# Patient Record
Sex: FEMALE | Race: BLACK OR AFRICAN AMERICAN | Employment: FULL TIME | URBAN - METROPOLITAN AREA
[De-identification: names, ages, dates, MRNs, and addresses within clinical notes are randomized per-mention and may not be internally consistent; named-entity substitution may affect disease eponyms.]

---

## 2017-12-12 ENCOUNTER — OP HISTORICAL/CONVERTED ENCOUNTER (OUTPATIENT)
Dept: OTHER | Age: 62
End: 2017-12-12

## 2018-06-17 ENCOUNTER — ED HISTORICAL/CONVERTED ENCOUNTER (OUTPATIENT)
Dept: OTHER | Age: 63
End: 2018-06-17

## 2018-09-10 ENCOUNTER — HOSPITAL ENCOUNTER (OUTPATIENT)
Dept: PREADMISSION TESTING | Age: 63
Discharge: HOME | End: 2018-09-10
Payer: COMMERCIAL

## 2018-09-10 VITALS
WEIGHT: 193.34 LBS | SYSTOLIC BLOOD PRESSURE: 120 MMHG | OXYGEN SATURATION: 97 % | HEART RATE: 71 BPM | DIASTOLIC BLOOD PRESSURE: 58 MMHG | TEMPERATURE: 98.4 F | BODY MASS INDEX: 35.58 KG/M2 | HEIGHT: 62 IN | RESPIRATION RATE: 16 BRPM

## 2018-09-10 LAB
ABO + RH BLD: NORMAL
ALBUMIN SERPL-MCNC: 3.8 G/DL (ref 3.5–5)
ALBUMIN/GLOB SERPL: 1.2 {RATIO} (ref 1.1–2.2)
ALP SERPL-CCNC: 90 U/L (ref 45–117)
ALT SERPL-CCNC: 19 U/L (ref 12–78)
ANION GAP SERPL CALC-SCNC: 10 MMOL/L (ref 5–15)
APPEARANCE UR: ABNORMAL
APTT PPP: 25.9 SEC (ref 22.1–32)
AST SERPL-CCNC: 15 U/L (ref 15–37)
ATRIAL RATE: 68 BPM
BACTERIA URNS QL MICRO: NEGATIVE /HPF
BASOPHILS # BLD: 0 K/UL (ref 0–0.1)
BASOPHILS NFR BLD: 0 % (ref 0–1)
BILIRUB SERPL-MCNC: 0.5 MG/DL (ref 0.2–1)
BILIRUB UR QL: NEGATIVE
BLOOD GROUP ANTIBODIES SERPL: NORMAL
BUN SERPL-MCNC: 11 MG/DL (ref 6–20)
BUN/CREAT SERPL: 13 (ref 12–20)
CALCIUM SERPL-MCNC: 9.6 MG/DL (ref 8.5–10.1)
CALCULATED P AXIS, ECG09: 35 DEGREES
CALCULATED R AXIS, ECG10: 59 DEGREES
CALCULATED T AXIS, ECG11: 25 DEGREES
CHLORIDE SERPL-SCNC: 107 MMOL/L (ref 97–108)
CO2 SERPL-SCNC: 27 MMOL/L (ref 21–32)
COLOR UR: ABNORMAL
CREAT SERPL-MCNC: 0.88 MG/DL (ref 0.55–1.02)
DIAGNOSIS, 93000: NORMAL
DIFFERENTIAL METHOD BLD: ABNORMAL
EOSINOPHIL # BLD: 0.2 K/UL (ref 0–0.4)
EOSINOPHIL NFR BLD: 2 % (ref 0–7)
EPITH CASTS URNS QL MICRO: ABNORMAL /LPF
ERYTHROCYTE [DISTWIDTH] IN BLOOD BY AUTOMATED COUNT: 14.6 % (ref 11.5–14.5)
EST. AVERAGE GLUCOSE BLD GHB EST-MCNC: 128 MG/DL
GLOBULIN SER CALC-MCNC: 3.3 G/DL (ref 2–4)
GLUCOSE SERPL-MCNC: 98 MG/DL (ref 65–100)
GLUCOSE UR STRIP.AUTO-MCNC: NEGATIVE MG/DL
HBA1C MFR BLD: 6.1 % (ref 4.2–6.3)
HCT VFR BLD AUTO: 44.9 % (ref 35–47)
HGB BLD-MCNC: 13.8 G/DL (ref 11.5–16)
HGB UR QL STRIP: NEGATIVE
HYALINE CASTS URNS QL MICRO: ABNORMAL /LPF (ref 0–5)
IMM GRANULOCYTES # BLD: 0 K/UL (ref 0–0.04)
IMM GRANULOCYTES NFR BLD AUTO: 0 % (ref 0–0.5)
INR PPP: 1 (ref 0.9–1.1)
KETONES UR QL STRIP.AUTO: NEGATIVE MG/DL
LEUKOCYTE ESTERASE UR QL STRIP.AUTO: NEGATIVE
LYMPHOCYTES # BLD: 2.9 K/UL (ref 0.8–3.5)
LYMPHOCYTES NFR BLD: 46 % (ref 12–49)
MCH RBC QN AUTO: 25.7 PG (ref 26–34)
MCHC RBC AUTO-ENTMCNC: 30.7 G/DL (ref 30–36.5)
MCV RBC AUTO: 83.6 FL (ref 80–99)
MONOCYTES # BLD: 0.4 K/UL (ref 0–1)
MONOCYTES NFR BLD: 7 % (ref 5–13)
NEUTS SEG # BLD: 2.9 K/UL (ref 1.8–8)
NEUTS SEG NFR BLD: 45 % (ref 32–75)
NITRITE UR QL STRIP.AUTO: NEGATIVE
NRBC # BLD: 0 K/UL (ref 0–0.01)
NRBC BLD-RTO: 0 PER 100 WBC
P-R INTERVAL, ECG05: 150 MS
PH UR STRIP: 5 [PH] (ref 5–8)
PLATELET # BLD AUTO: 295 K/UL (ref 150–400)
PMV BLD AUTO: 9.6 FL (ref 8.9–12.9)
POTASSIUM SERPL-SCNC: 4.4 MMOL/L (ref 3.5–5.1)
PROT SERPL-MCNC: 7.1 G/DL (ref 6.4–8.2)
PROT UR STRIP-MCNC: NEGATIVE MG/DL
PROTHROMBIN TIME: 10 SEC (ref 9–11.1)
Q-T INTERVAL, ECG07: 404 MS
QRS DURATION, ECG06: 86 MS
QTC CALCULATION (BEZET), ECG08: 429 MS
RBC # BLD AUTO: 5.37 M/UL (ref 3.8–5.2)
RBC #/AREA URNS HPF: ABNORMAL /HPF (ref 0–5)
SODIUM SERPL-SCNC: 144 MMOL/L (ref 136–145)
SP GR UR REFRACTOMETRY: 1.01 (ref 1–1.03)
SPECIMEN EXP DATE BLD: NORMAL
THERAPEUTIC RANGE,PTTT: NORMAL SECS (ref 58–77)
UA: UC IF INDICATED,UAUC: ABNORMAL
UROBILINOGEN UR QL STRIP.AUTO: 0.2 EU/DL (ref 0.2–1)
VENTRICULAR RATE, ECG03: 68 BPM
WBC # BLD AUTO: 6.4 K/UL (ref 3.6–11)
WBC URNS QL MICRO: ABNORMAL /HPF (ref 0–4)

## 2018-09-10 PROCEDURE — 85610 PROTHROMBIN TIME: CPT | Performed by: ORTHOPAEDIC SURGERY

## 2018-09-10 PROCEDURE — 85730 THROMBOPLASTIN TIME PARTIAL: CPT | Performed by: ORTHOPAEDIC SURGERY

## 2018-09-10 PROCEDURE — 81001 URINALYSIS AUTO W/SCOPE: CPT | Performed by: ORTHOPAEDIC SURGERY

## 2018-09-10 PROCEDURE — 36415 COLL VENOUS BLD VENIPUNCTURE: CPT | Performed by: ORTHOPAEDIC SURGERY

## 2018-09-10 PROCEDURE — 85025 COMPLETE CBC W/AUTO DIFF WBC: CPT | Performed by: ORTHOPAEDIC SURGERY

## 2018-09-10 PROCEDURE — 80053 COMPREHEN METABOLIC PANEL: CPT | Performed by: ORTHOPAEDIC SURGERY

## 2018-09-10 PROCEDURE — 83036 HEMOGLOBIN GLYCOSYLATED A1C: CPT | Performed by: ORTHOPAEDIC SURGERY

## 2018-09-10 PROCEDURE — 86900 BLOOD TYPING SEROLOGIC ABO: CPT | Performed by: ORTHOPAEDIC SURGERY

## 2018-09-10 PROCEDURE — 93005 ELECTROCARDIOGRAM TRACING: CPT

## 2018-09-10 RX ORDER — TRAMADOL HYDROCHLORIDE 50 MG/1
50 TABLET ORAL
COMMUNITY
End: 2018-09-24

## 2018-09-10 RX ORDER — PRAVASTATIN SODIUM 40 MG/1
40 TABLET ORAL
COMMUNITY

## 2018-09-10 RX ORDER — BENAZEPRIL HYDROCHLORIDE 10 MG/1
10 TABLET ORAL DAILY
COMMUNITY

## 2018-09-10 RX ORDER — PANTOPRAZOLE SODIUM 40 MG/1
40 TABLET, DELAYED RELEASE ORAL DAILY
COMMUNITY

## 2018-09-10 RX ORDER — GABAPENTIN 600 MG/1
900 TABLET ORAL
COMMUNITY
End: 2018-09-24

## 2018-09-10 RX ORDER — CYCLOBENZAPRINE HCL 5 MG
5 TABLET ORAL
COMMUNITY
End: 2018-09-24

## 2018-09-10 NOTE — PERIOP NOTES
[facility], [address]    MAIN OR [phone]    MAIN PRE OP [phone]    AMBULATORY PRE OP [phone]    PRE-ADMISSION TESTING [phone]       Surgery Date:   9/18/2018        Is surgery arrival time given by surgeon? NO  If NO, [facility] staff will call you between 3 and 7pm the day before your surgery with your arrival time. (If your surgery is on a Monday, we will call you the Friday before.)    Call [phone] after 7pm Monday-Friday if you did not receive your arrival time.     Answers to Common Questions   When You  Arrive   Arrive at the [facility] [address] [facility] on the day of your surgery  Have your insurance card, photo ID, and any copayment (if needed)     Food   and   Drink   NO food or drink after midnight the night before surgery    This means NO water, gum, mints, coffee, juice, etc.  No alcohol (beer, wine, liquor) 24 hours before and after surgery     Medicine to   TAKE   Morning of Surgery   MEDICATIONS TO TAKE THE MORNING OF SURGERY WITH A SIP OF WATER:    HOLD the benazepril but take the pantoprazole and may take tramadol or cyclobenzaprine as needed     Medicine  To  STOP   FOR PAIN   You can take Tylenol - follow instructions on the bottle   NO Aspirin for pain    NO Non-Steroidal Anti-Inflammatory Drugs (NSAIDs:   for example, Ibuprofen (Advil, Motrin), Naproxen (Aleve)   STOP herbal supplements and vitamins 1 week before surgery     Blood  Thinners    If you take Aspirin, Plavix, Coumadin, blood-thinning or anti-clot medicine, talk to your surgeon and/or follow the instructions from the doctor who told you to take that medicine     Clothing  Jewelry  Valuables  Bathing     CLOTHING   Wear loose, comfortable clothes   Wear glasses instead of contacts   Leave money, jewelry and valuables at home   No make-up, particularly mascara, the day of surgery   REMOVE ALL piercings, rings, and jewelry - leave at home   Wear hair loose or down; no pony-tails, buns, or metal hair clips    BATHING   Follow all special bath instructions (for total joint replacement, spine and bowel surgeries.)   If you shower the morning of surgery, please do not apply any lotions, powders, or deodorants afterwards. Do not shave or trim anywhere 24 hours before surgery. Going Home  or  Spending the Night    SAME-DAY SURGERY: You must have a responsible adult drive you home and stay with you 24 hours after surgery   ADMITS: If your doctor is keeping you into the hospital after surgery, leave personal belongings/luggage in your car until you have a hospital room number. Hospital discharge time is 12 noon  Drivers must be here before 12 noon unless you are told differently         Follow all instructions so your surgery wont be cancelled. Please, be on time. If a situation occurs and you are delayed the day of surgery, call [phone] or [phone]. If your physical condition changes (like a fever, cold, flu, etc.) call your surgeon as soon as possible. The Preadmission Testing staff can be reached at [phone]. OTHER SPECIAL INSTRUCTIONS:  Use care fusion wash as directed for the 3 days before the day of surgery. Practice with incentive spirometer twice a day for the week before surgery and pack it in your overnight bag, which you need to leave in the car until there is a room assigned. Free  parking 7-5    The patient and spouse was contacted  in person. She  verbalize  understanding of all instructions and does not  need reinforcement.

## 2018-09-10 NOTE — H&P
PAT Pre-Op History & Physical    Patient: Brandon Chowdhury                  MRN: [id number]          SSN: [id number]  YOB: 1955          Age: 61 y.o. Sex: female                Subjective:   Patient is a 61 y.o.  female who presents with history of neck pain that began about 4 months ago per patient report. Denies and trauma or injury that preceded the onset of her neck pain. States that the pain radiates from her posterior left neck into her left shoulder and down her left arm into her left hand. Describes the pain as constant and sharp. Also c/o tingling in her left arm and left hand. Rates her pain 9/10. States that turning her head and looking up exacerbate her pain and it keeps her from falling asleep/staying asleep at night. Also cannot do household chores such as washing dishes due to her neck pain. Patient is left hand dominant and states she has noticed weakness in that hand. Has failed oral steroids, NSAIDs, muscle relaxants, Gabapentin, narcotic pain medication, and application of heat. The patient was evaluated in the surgeon's office and it was determined that the most appropriate plan of care is to proceed with surgical intervention. Patient's PCP Nam Kearney MD [phone].       Past Medical History:   Diagnosis Date    Arthritis     GERD (gastroesophageal reflux disease)     H/O degenerative disc disease     Hyperlipemia     Hypertension     Nausea & vomiting     Obesity (BMI 30-39.9) 09/10/2018    BMI= 36    Sleep apnea     CPAP- has not used since weight loss    Vertigo       Past Surgical History:   Procedure Laterality Date    ABDOMEN SURGERY PROC UNLISTED  1989    Abdominal wall surgery after childbirth    HX COLONOSCOPY      HX HEART CATHETERIZATION  2014    Patient states no blockages found    HX HYSTERECTOMY  1999    HX ORTHOPAEDIC Left 2014    hip replacement      Prior to Admission medications    Medication Sig Start Date End Date Taking? Authorizing Provider   gabapentin (NEURONTIN) 600 mg tablet Take 900 mg by mouth nightly. Yes Historical Provider   traMADol (ULTRAM) 50 mg tablet Take 50 mg by mouth every six (6) hours as needed for Pain. Yes Historical Provider   pravastatin (PRAVACHOL) 40 mg tablet Take 40 mg by mouth nightly. Yes Historical Provider   pantoprazole (PROTONIX) 40 mg tablet Take 40 mg by mouth daily. Yes Historical Provider   benazepril (LOTENSIN) 10 mg tablet Take 10 mg by mouth daily. Yes Historical Provider   cyclobenzaprine (FLEXERIL) 5 mg tablet Take 5 mg by mouth three (3) times daily as needed for Muscle Spasm(s). Yes Historical Provider     Current Outpatient Prescriptions   Medication Sig    gabapentin (NEURONTIN) 600 mg tablet Take 900 mg by mouth nightly.  traMADol (ULTRAM) 50 mg tablet Take 50 mg by mouth every six (6) hours as needed for Pain.  pravastatin (PRAVACHOL) 40 mg tablet Take 40 mg by mouth nightly.  pantoprazole (PROTONIX) 40 mg tablet Take 40 mg by mouth daily.  benazepril (LOTENSIN) 10 mg tablet Take 10 mg by mouth daily.  cyclobenzaprine (FLEXERIL) 5 mg tablet Take 5 mg by mouth three (3) times daily as needed for Muscle Spasm(s). No current facility-administered medications for this encounter.        Allergies   Allergen Reactions    Codeine Other (comments)     Feels confused, \"makes me feel like I'm going out my head\"    Pcn [Penicillins] Itching    Tomato Itching      Social History   Substance Use Topics    Smoking status: Never Smoker    Smokeless tobacco: Never Used    Alcohol use No      History   Drug Use No     Family History   Problem Relation Age of Onset    Heart Failure Mother      pacemaker    Hypertension Mother     Diabetes Mother     High Cholesterol Mother     Cancer Father      Bladder    High Cholesterol Father     Hypertension Father     No Known Problems Sister     Colon Cancer Brother     Alcohol abuse Brother Cirrrohis    Arrhythmia Sister      Tachycardia    Diabetes Sister     Arthritis-osteo Sister          Review of Systems    Patient denies difficulty swallowing, mouth sores, or loose teeth. Patient denies any recent dental procedures or any planned prior to surgery. Patient denies chest pain, tightness, palpitations. Denies dizziness, visual disturbances, or lightheadedness. Patient denies shortness of breath, wheezing, cough, fever, or chills. Patient denies diarrhea, constipation, or abdominal pain. Patient denies urinary problems including dysuria, hesitancy, urgency, or incontinence. Denies skin breakdown, rashes, insect bites or open area. C/o neck/ left arm pain. Objective:   Patient Vitals for the past 24 hrs:   Temp Pulse Resp BP SpO2   09/10/18 0907 98.4 °F (36.9 °C) 71 16 120/58 97 %     Temp (24hrs), Av.4 °F (36.9 °C), Min:98.4 °F (36.9 °C), Max:98.4 °F (36.9 °C)    Body mass index is 35.94 kg/(m^2). Wt Readings from Last 1 Encounters:   09/10/18 87.7 kg (193 lb 5.5 oz)        Physical Exam:     General: Pleasant,  cooperative, no apparent distress, appears stated age. Uses cane to ambulate. Eyes: Conjunctivae/corneas clear. EOMs intact. Nose: Nares normal.   Mouth/Throat: Lips, mucosa, and tongue normal. Teeth and gums normal.   Neck: Symmetrical, trachea midline. Back: Symmetric   Lungs: Clear to auscultation bilaterally. Heart: Regular rate and rhythm, S1, S2 normal. No murmur, click, rub or gallop. Abdomen: Soft, non-tender. Bowel sounds normal. No distention. Musculoskeletal:  Cervical ROM limited by discomfort. Extremities:  Extremities normal, atraumatic, no cyanosis or edema. Calves                                 supple, non tender to palpation. Pulses: 2+ and symmetric bilateral upper extremities. Cap. refill <2 seconds   Skin: Skin color, texture, turgor normal.  No visible rashes or lesions. Neurologic: CN II-XII grossly intact.   Alert and oriented x3.    Labs:   Recent Results (from the past 72 hour(s))   CULTURE, MRSA    Collection Time: 09/10/18  9:38 AM   Result Value Ref Range    Special Requests: NO SPECIAL REQUESTS      Culture result: MRSA NOT PRESENT      Culture result:            Screening of patient nares for MRSA is for surveillance purposes and, if positive, to facilitate isolation considerations in high risk settings. It is not intended for automatic decolonization interventions per se as regimens are not sufficiently effective to warrant routine use. CBC WITH AUTOMATED DIFF    Collection Time: 09/10/18 10:08 AM   Result Value Ref Range    WBC 6.4 3.6 - 11.0 K/uL    RBC 5.37 (H) 3.80 - 5.20 M/uL    HGB 13.8 11.5 - 16.0 g/dL    HCT 44.9 35.0 - 47.0 %    MCV 83.6 80.0 - 99.0 FL    MCH 25.7 (L) 26.0 - 34.0 PG    MCHC 30.7 30.0 - 36.5 g/dL    RDW 14.6 (H) 11.5 - 14.5 %    PLATELET 949 742 - 592 K/uL    MPV 9.6 8.9 - 12.9 FL    NRBC 0.0 0  WBC    ABSOLUTE NRBC 0.00 0.00 - 0.01 K/uL    NEUTROPHILS 45 32 - 75 %    LYMPHOCYTES 46 12 - 49 %    MONOCYTES 7 5 - 13 %    EOSINOPHILS 2 0 - 7 %    BASOPHILS 0 0 - 1 %    IMMATURE GRANULOCYTES 0 0.0 - 0.5 %    ABS. NEUTROPHILS 2.9 1.8 - 8.0 K/UL    ABS. LYMPHOCYTES 2.9 0.8 - 3.5 K/UL    ABS. MONOCYTES 0.4 0.0 - 1.0 K/UL    ABS. EOSINOPHILS 0.2 0.0 - 0.4 K/UL    ABS. BASOPHILS 0.0 0.0 - 0.1 K/UL    ABS. IMM.  GRANS. 0.0 0.00 - 0.04 K/UL    DF AUTOMATED     METABOLIC PANEL, COMPREHENSIVE    Collection Time: 09/10/18 10:08 AM   Result Value Ref Range    Sodium 144 136 - 145 mmol/L    Potassium 4.4 3.5 - 5.1 mmol/L    Chloride 107 97 - 108 mmol/L    CO2 27 21 - 32 mmol/L    Anion gap 10 5 - 15 mmol/L    Glucose 98 65 - 100 mg/dL    BUN 11 6 - 20 MG/DL    Creatinine 0.88 0.55 - 1.02 MG/DL    BUN/Creatinine ratio 13 12 - 20      GFR est AA >60 >60 ml/min/1.73m2    GFR est non-AA >60 >60 ml/min/1.73m2    Calcium 9.6 8.5 - 10.1 MG/DL    Bilirubin, total 0.5 0.2 - 1.0 MG/DL    ALT (SGPT) 19 12 - 78 U/L    AST (SGOT) 15 15 - 37 U/L    Alk.  phosphatase 90 45 - 117 U/L    Protein, total 7.1 6.4 - 8.2 g/dL    Albumin 3.8 3.5 - 5.0 g/dL    Globulin 3.3 2.0 - 4.0 g/dL    A-G Ratio 1.2 1.1 - 2.2     HEMOGLOBIN A1C WITH EAG    Collection Time: 09/10/18 10:08 AM   Result Value Ref Range    Hemoglobin A1c 6.1 4.2 - 6.3 %    Est. average glucose 128 mg/dL   URINALYSIS W/ REFLEX CULTURE    Collection Time: 09/10/18 10:08 AM   Result Value Ref Range    Color YELLOW/STRAW      Appearance CLOUDY (A) CLEAR      Specific gravity 1.015 1.003 - 1.030      pH (UA) 5.0 5.0 - 8.0      Protein NEGATIVE  NEG mg/dL    Glucose NEGATIVE  NEG mg/dL    Ketone NEGATIVE  NEG mg/dL    Bilirubin NEGATIVE  NEG      Blood NEGATIVE  NEG      Urobilinogen 0.2 0.2 - 1.0 EU/dL    Nitrites NEGATIVE  NEG      Leukocyte Esterase NEGATIVE  NEG      WBC 0-4 0 - 4 /hpf    RBC 0-5 0 - 5 /hpf    Epithelial cells FEW FEW /lpf    Bacteria NEGATIVE  NEG /hpf    UA:UC IF INDICATED CULTURE NOT INDICATED BY UA RESULT CNI      Hyaline cast 0-2 0 - 5 /lpf   TYPE & SCREEN    Collection Time: 09/10/18 10:08 AM   Result Value Ref Range    Crossmatch Expiration 09/21/2018     ABO/Rh(D) B POSITIVE     Antibody screen NEG    PTT    Collection Time: 09/10/18 10:08 AM   Result Value Ref Range    aPTT 25.9 22.1 - 32.0 sec    aPTT, therapeutic range     58.0 - 77.0 SECS   PROTHROMBIN TIME + INR    Collection Time: 09/10/18 10:08 AM   Result Value Ref Range    INR 1.0 0.9 - 1.1      Prothrombin time 10.0 9.0 - 11.1 sec   EKG, 12 LEAD, INITIAL    Collection Time: 09/10/18 10:32 AM   Result Value Ref Range    Ventricular Rate 68 BPM    Atrial Rate 68 BPM    P-R Interval 150 ms    QRS Duration 86 ms    Q-T Interval 404 ms    QTC Calculation (Bezet) 429 ms    Calculated P Axis 35 degrees    Calculated R Axis 59 degrees    Calculated T Axis 25 degrees    Diagnosis       Normal sinus rhythm  Normal ECG  No previous ECGs available  Confirmed by Young Gutierrez MD. ([id number]) on 9/10/2018 10:55:52 PM         Assessment:     Acute cervical radiculopathy [M54.12]  Cervical spinal stenosis [M48.02]    Plan:     Scheduled for C4 - C7  ACDF with instrumentation. Labs and EKG done per surgeon's orders. Lab results and EKG reviewed- unremarkable. MRSA negative.         Inell Balshar, NP

## 2018-09-11 LAB
BACTERIA SPEC CULT: NORMAL
BACTERIA SPEC CULT: NORMAL
SERVICE CMNT-IMP: NORMAL

## 2018-09-17 ENCOUNTER — ANESTHESIA EVENT (OUTPATIENT)
Dept: SURGERY | Age: 63
DRG: 473 | End: 2018-09-17
Payer: COMMERCIAL

## 2018-09-18 ENCOUNTER — APPOINTMENT (OUTPATIENT)
Dept: GENERAL RADIOLOGY | Age: 63
DRG: 473 | End: 2018-09-18
Attending: ORTHOPAEDIC SURGERY
Payer: COMMERCIAL

## 2018-09-18 ENCOUNTER — ANESTHESIA (OUTPATIENT)
Dept: SURGERY | Age: 63
DRG: 473 | End: 2018-09-18
Payer: COMMERCIAL

## 2018-09-18 ENCOUNTER — HOSPITAL ENCOUNTER (INPATIENT)
Age: 63
LOS: 3 days | Discharge: NURSING, REHAB OR LONG-TERM CARE | DRG: 473 | End: 2018-09-24
Attending: ORTHOPAEDIC SURGERY | Admitting: ORTHOPAEDIC SURGERY
Payer: COMMERCIAL

## 2018-09-18 DIAGNOSIS — G89.18 ACUTE POSTOPERATIVE PAIN: ICD-10-CM

## 2018-09-18 DIAGNOSIS — M48.02 CERVICAL STENOSIS OF SPINAL CANAL: Primary | ICD-10-CM

## 2018-09-18 PROCEDURE — 77030003666 HC NDL SPINAL BD -A: Performed by: ORTHOPAEDIC SURGERY

## 2018-09-18 PROCEDURE — 74011000250 HC RX REV CODE- 250

## 2018-09-18 PROCEDURE — 77030008684 HC TU ET CUF COVD -B: Performed by: ANESTHESIOLOGY

## 2018-09-18 PROCEDURE — 0RB30ZZ EXCISION OF CERVICAL VERTEBRAL DISC, OPEN APPROACH: ICD-10-PCS | Performed by: ORTHOPAEDIC SURGERY

## 2018-09-18 PROCEDURE — 76060000036 HC ANESTHESIA 2.5 TO 3 HR: Performed by: ORTHOPAEDIC SURGERY

## 2018-09-18 PROCEDURE — 0RG20A0 FUSION OF 2 OR MORE CERVICAL VERTEBRAL JOINTS WITH INTERBODY FUSION DEVICE, ANTERIOR APPROACH, ANTERIOR COLUMN, OPEN APPROACH: ICD-10-PCS | Performed by: ORTHOPAEDIC SURGERY

## 2018-09-18 PROCEDURE — 77030030102 HC BIT DRL PYRNES K2M -B: Performed by: ORTHOPAEDIC SURGERY

## 2018-09-18 PROCEDURE — C1713 ANCHOR/SCREW BN/BN,TIS/BN: HCPCS | Performed by: ORTHOPAEDIC SURGERY

## 2018-09-18 PROCEDURE — 99218 HC RM OBSERVATION: CPT

## 2018-09-18 PROCEDURE — 76210000017 HC OR PH I REC 1.5 TO 2 HR: Performed by: ORTHOPAEDIC SURGERY

## 2018-09-18 PROCEDURE — 77030018836 HC SOL IRR NACL ICUM -A: Performed by: ORTHOPAEDIC SURGERY

## 2018-09-18 PROCEDURE — 77030020782 HC GWN BAIR PAWS FLX 3M -B

## 2018-09-18 PROCEDURE — 77030004391 HC BUR FLUT MEDT -C: Performed by: ORTHOPAEDIC SURGERY

## 2018-09-18 PROCEDURE — 77030029099 HC BN WAX SSPC -A: Performed by: ORTHOPAEDIC SURGERY

## 2018-09-18 PROCEDURE — 77030031139 HC SUT VCRL2 J&J -A: Performed by: ORTHOPAEDIC SURGERY

## 2018-09-18 PROCEDURE — 77030037302 HC SPCR CERV LORDTC INLC -G: Performed by: ORTHOPAEDIC SURGERY

## 2018-09-18 PROCEDURE — 74011250636 HC RX REV CODE- 250/636: Performed by: ANESTHESIOLOGY

## 2018-09-18 PROCEDURE — 74011250636 HC RX REV CODE- 250/636: Performed by: ORTHOPAEDIC SURGERY

## 2018-09-18 PROCEDURE — 76000 FLUOROSCOPY <1 HR PHYS/QHP: CPT

## 2018-09-18 PROCEDURE — 77030018673: Performed by: ORTHOPAEDIC SURGERY

## 2018-09-18 PROCEDURE — 77030032490 HC SLV COMPR SCD KNE COVD -B: Performed by: ORTHOPAEDIC SURGERY

## 2018-09-18 PROCEDURE — 74011250637 HC RX REV CODE- 250/637: Performed by: ORTHOPAEDIC SURGERY

## 2018-09-18 PROCEDURE — 76010000172 HC OR TIME 2.5 TO 3 HR INTENSV-TIER 1: Performed by: ORTHOPAEDIC SURGERY

## 2018-09-18 PROCEDURE — 74011250636 HC RX REV CODE- 250/636

## 2018-09-18 PROCEDURE — 74011000272 HC RX REV CODE- 272: Performed by: ORTHOPAEDIC SURGERY

## 2018-09-18 PROCEDURE — 77030011640 HC PAD GRND REM COVD -A: Performed by: ORTHOPAEDIC SURGERY

## 2018-09-18 PROCEDURE — 77030018846 HC SOL IRR STRL H20 ICUM -A: Performed by: ORTHOPAEDIC SURGERY

## 2018-09-18 PROCEDURE — 77030013079 HC BLNKT BAIR HGGR 3M -A: Performed by: ANESTHESIOLOGY

## 2018-09-18 PROCEDURE — 77030012406 HC DRN WND PENRS BARD -A: Performed by: ORTHOPAEDIC SURGERY

## 2018-09-18 PROCEDURE — 74011000250 HC RX REV CODE- 250: Performed by: ORTHOPAEDIC SURGERY

## 2018-09-18 PROCEDURE — 77030011267 HC ELECTRD BLD COVD -A: Performed by: ORTHOPAEDIC SURGERY

## 2018-09-18 PROCEDURE — 77030002933 HC SUT MCRYL J&J -A: Performed by: ORTHOPAEDIC SURGERY

## 2018-09-18 PROCEDURE — 77030026438 HC STYL ET INTUB CARD -A: Performed by: ANESTHESIOLOGY

## 2018-09-18 DEVICE — PLATE SPNL L54MM BILAT ANTR CERV TI 3 LEV CONSTRN LO PROF: Type: IMPLANTABLE DEVICE | Site: NECK | Status: FUNCTIONAL

## 2018-09-18 DEVICE — SPACER SPNL L15XW13XH9MM ANT CERV INTBDY FUS LORD CAPISTRANO: Type: IMPLANTABLE DEVICE | Site: NECK | Status: FUNCTIONAL

## 2018-09-18 DEVICE — IMPLANTABLE DEVICE: Type: IMPLANTABLE DEVICE | Site: NECK | Status: FUNCTIONAL

## 2018-09-18 DEVICE — SCREW SPNL L14MM DIA4MM CERV ST CONSTRN PYRENEES: Type: IMPLANTABLE DEVICE | Site: NECK | Status: FUNCTIONAL

## 2018-09-18 RX ORDER — ONDANSETRON 2 MG/ML
4 INJECTION INTRAMUSCULAR; INTRAVENOUS
Status: DISCONTINUED | OUTPATIENT
Start: 2018-09-18 | End: 2018-09-24 | Stop reason: HOSPADM

## 2018-09-18 RX ORDER — OXYCODONE HYDROCHLORIDE 5 MG/1
5 TABLET ORAL
Status: DISCONTINUED | OUTPATIENT
Start: 2018-09-18 | End: 2018-09-19

## 2018-09-18 RX ORDER — SODIUM CHLORIDE 0.9 % (FLUSH) 0.9 %
5-10 SYRINGE (ML) INJECTION EVERY 8 HOURS
Status: DISCONTINUED | OUTPATIENT
Start: 2018-09-18 | End: 2018-09-18 | Stop reason: HOSPADM

## 2018-09-18 RX ORDER — SODIUM CHLORIDE 0.9 % (FLUSH) 0.9 %
5-10 SYRINGE (ML) INJECTION AS NEEDED
Status: DISCONTINUED | OUTPATIENT
Start: 2018-09-18 | End: 2018-09-18 | Stop reason: HOSPADM

## 2018-09-18 RX ORDER — FACIAL-BODY WIPES
10 EACH TOPICAL DAILY PRN
Status: DISCONTINUED | OUTPATIENT
Start: 2018-09-20 | End: 2018-09-24 | Stop reason: HOSPADM

## 2018-09-18 RX ORDER — NALOXONE HYDROCHLORIDE 0.4 MG/ML
0.2 INJECTION, SOLUTION INTRAMUSCULAR; INTRAVENOUS; SUBCUTANEOUS
Status: DISCONTINUED | OUTPATIENT
Start: 2018-09-18 | End: 2018-09-18 | Stop reason: HOSPADM

## 2018-09-18 RX ORDER — SODIUM CHLORIDE 9 MG/ML
125 INJECTION, SOLUTION INTRAVENOUS CONTINUOUS
Status: DISPENSED | OUTPATIENT
Start: 2018-09-18 | End: 2018-09-19

## 2018-09-18 RX ORDER — GABAPENTIN 300 MG/1
900 CAPSULE ORAL
Status: DISCONTINUED | OUTPATIENT
Start: 2018-09-18 | End: 2018-09-24 | Stop reason: HOSPADM

## 2018-09-18 RX ORDER — CEFAZOLIN SODIUM/WATER 2 G/20 ML
2 SYRINGE (ML) INTRAVENOUS ONCE
Status: COMPLETED | OUTPATIENT
Start: 2018-09-18 | End: 2018-09-18

## 2018-09-18 RX ORDER — SODIUM CHLORIDE 0.9 % (FLUSH) 0.9 %
5-10 SYRINGE (ML) INJECTION EVERY 8 HOURS
Status: DISCONTINUED | OUTPATIENT
Start: 2018-09-19 | End: 2018-09-24 | Stop reason: HOSPADM

## 2018-09-18 RX ORDER — ROCURONIUM BROMIDE 10 MG/ML
INJECTION, SOLUTION INTRAVENOUS AS NEEDED
Status: DISCONTINUED | OUTPATIENT
Start: 2018-09-18 | End: 2018-09-18 | Stop reason: HOSPADM

## 2018-09-18 RX ORDER — AMOXICILLIN 250 MG
1 CAPSULE ORAL 2 TIMES DAILY
Status: DISCONTINUED | OUTPATIENT
Start: 2018-09-19 | End: 2018-09-24 | Stop reason: HOSPADM

## 2018-09-18 RX ORDER — PANTOPRAZOLE SODIUM 40 MG/1
40 TABLET, DELAYED RELEASE ORAL
Status: DISCONTINUED | OUTPATIENT
Start: 2018-09-19 | End: 2018-09-24 | Stop reason: HOSPADM

## 2018-09-18 RX ORDER — DEXAMETHASONE SODIUM PHOSPHATE 4 MG/ML
INJECTION, SOLUTION INTRA-ARTICULAR; INTRALESIONAL; INTRAMUSCULAR; INTRAVENOUS; SOFT TISSUE AS NEEDED
Status: DISCONTINUED | OUTPATIENT
Start: 2018-09-18 | End: 2018-09-18 | Stop reason: HOSPADM

## 2018-09-18 RX ORDER — SODIUM CHLORIDE, SODIUM LACTATE, POTASSIUM CHLORIDE, CALCIUM CHLORIDE 600; 310; 30; 20 MG/100ML; MG/100ML; MG/100ML; MG/100ML
INJECTION, SOLUTION INTRAVENOUS
Status: DISCONTINUED | OUTPATIENT
Start: 2018-09-18 | End: 2018-09-18 | Stop reason: HOSPADM

## 2018-09-18 RX ORDER — HYDROMORPHONE HYDROCHLORIDE 2 MG/ML
0.5 INJECTION, SOLUTION INTRAMUSCULAR; INTRAVENOUS; SUBCUTANEOUS
Status: ACTIVE | OUTPATIENT
Start: 2018-09-18 | End: 2018-09-19

## 2018-09-18 RX ORDER — DIPHENHYDRAMINE HYDROCHLORIDE 50 MG/ML
12.5 INJECTION, SOLUTION INTRAMUSCULAR; INTRAVENOUS
Status: DISCONTINUED | OUTPATIENT
Start: 2018-09-18 | End: 2018-09-24 | Stop reason: HOSPADM

## 2018-09-18 RX ORDER — NEOSTIGMINE METHYLSULFATE 1 MG/ML
INJECTION INTRAVENOUS AS NEEDED
Status: DISCONTINUED | OUTPATIENT
Start: 2018-09-18 | End: 2018-09-18 | Stop reason: HOSPADM

## 2018-09-18 RX ORDER — HYDROMORPHONE HYDROCHLORIDE 1 MG/ML
.25-1 INJECTION, SOLUTION INTRAMUSCULAR; INTRAVENOUS; SUBCUTANEOUS
Status: DISCONTINUED | OUTPATIENT
Start: 2018-09-18 | End: 2018-09-18 | Stop reason: HOSPADM

## 2018-09-18 RX ORDER — POLYETHYLENE GLYCOL 3350 17 G/17G
17 POWDER, FOR SOLUTION ORAL DAILY
Status: DISCONTINUED | OUTPATIENT
Start: 2018-09-19 | End: 2018-09-24 | Stop reason: HOSPADM

## 2018-09-18 RX ORDER — ACETAMINOPHEN 325 MG/1
650 TABLET ORAL
Status: DISCONTINUED | OUTPATIENT
Start: 2018-09-18 | End: 2018-09-24 | Stop reason: HOSPADM

## 2018-09-18 RX ORDER — SODIUM CHLORIDE 0.9 % (FLUSH) 0.9 %
5-10 SYRINGE (ML) INJECTION AS NEEDED
Status: DISCONTINUED | OUTPATIENT
Start: 2018-09-18 | End: 2018-09-24 | Stop reason: HOSPADM

## 2018-09-18 RX ORDER — SODIUM CHLORIDE, SODIUM LACTATE, POTASSIUM CHLORIDE, CALCIUM CHLORIDE 600; 310; 30; 20 MG/100ML; MG/100ML; MG/100ML; MG/100ML
INJECTION, SOLUTION INTRAVENOUS
Status: DISCONTINUED | OUTPATIENT
Start: 2018-09-18 | End: 2018-09-18

## 2018-09-18 RX ORDER — MIDAZOLAM HYDROCHLORIDE 1 MG/ML
INJECTION, SOLUTION INTRAMUSCULAR; INTRAVENOUS AS NEEDED
Status: DISCONTINUED | OUTPATIENT
Start: 2018-09-18 | End: 2018-09-18 | Stop reason: HOSPADM

## 2018-09-18 RX ORDER — LIDOCAINE HYDROCHLORIDE 10 MG/ML
0.1 INJECTION, SOLUTION EPIDURAL; INFILTRATION; INTRACAUDAL; PERINEURAL AS NEEDED
Status: DISCONTINUED | OUTPATIENT
Start: 2018-09-18 | End: 2018-09-18 | Stop reason: HOSPADM

## 2018-09-18 RX ORDER — HYDROMORPHONE HCL IN 0.9% NACL 15 MG/30ML
PATIENT CONTROLLED ANALGESIA VIAL INTRAVENOUS
Status: DISPENSED | OUTPATIENT
Start: 2018-09-18 | End: 2018-09-19

## 2018-09-18 RX ORDER — OXYCODONE HYDROCHLORIDE 5 MG/1
10 TABLET ORAL
Status: DISCONTINUED | OUTPATIENT
Start: 2018-09-18 | End: 2018-09-19

## 2018-09-18 RX ORDER — SODIUM CHLORIDE, SODIUM LACTATE, POTASSIUM CHLORIDE, CALCIUM CHLORIDE 600; 310; 30; 20 MG/100ML; MG/100ML; MG/100ML; MG/100ML
125 INJECTION, SOLUTION INTRAVENOUS CONTINUOUS
Status: DISCONTINUED | OUTPATIENT
Start: 2018-09-18 | End: 2018-09-18 | Stop reason: HOSPADM

## 2018-09-18 RX ORDER — PROPOFOL 10 MG/ML
INJECTION, EMULSION INTRAVENOUS AS NEEDED
Status: DISCONTINUED | OUTPATIENT
Start: 2018-09-18 | End: 2018-09-18 | Stop reason: HOSPADM

## 2018-09-18 RX ORDER — BENAZEPRIL HYDROCHLORIDE 10 MG/1
10 TABLET ORAL DAILY
Status: DISCONTINUED | OUTPATIENT
Start: 2018-09-19 | End: 2018-09-24 | Stop reason: HOSPADM

## 2018-09-18 RX ORDER — NALOXONE HYDROCHLORIDE 0.4 MG/ML
0.4 INJECTION, SOLUTION INTRAMUSCULAR; INTRAVENOUS; SUBCUTANEOUS AS NEEDED
Status: DISCONTINUED | OUTPATIENT
Start: 2018-09-18 | End: 2018-09-24 | Stop reason: HOSPADM

## 2018-09-18 RX ORDER — HYDROMORPHONE HYDROCHLORIDE 2 MG/ML
INJECTION, SOLUTION INTRAMUSCULAR; INTRAVENOUS; SUBCUTANEOUS AS NEEDED
Status: DISCONTINUED | OUTPATIENT
Start: 2018-09-18 | End: 2018-09-18 | Stop reason: HOSPADM

## 2018-09-18 RX ORDER — CEFAZOLIN SODIUM/WATER 2 G/20 ML
2 SYRINGE (ML) INTRAVENOUS EVERY 8 HOURS
Status: COMPLETED | OUTPATIENT
Start: 2018-09-18 | End: 2018-09-19

## 2018-09-18 RX ORDER — FENTANYL CITRATE 50 UG/ML
INJECTION, SOLUTION INTRAMUSCULAR; INTRAVENOUS AS NEEDED
Status: DISCONTINUED | OUTPATIENT
Start: 2018-09-18 | End: 2018-09-18 | Stop reason: HOSPADM

## 2018-09-18 RX ORDER — DIPHENHYDRAMINE HYDROCHLORIDE 50 MG/ML
12.5 INJECTION, SOLUTION INTRAMUSCULAR; INTRAVENOUS AS NEEDED
Status: DISCONTINUED | OUTPATIENT
Start: 2018-09-18 | End: 2018-09-18 | Stop reason: HOSPADM

## 2018-09-18 RX ORDER — ONDANSETRON 2 MG/ML
INJECTION INTRAMUSCULAR; INTRAVENOUS AS NEEDED
Status: DISCONTINUED | OUTPATIENT
Start: 2018-09-18 | End: 2018-09-18 | Stop reason: HOSPADM

## 2018-09-18 RX ORDER — CYCLOBENZAPRINE HCL 10 MG
10 TABLET ORAL
Status: DISCONTINUED | OUTPATIENT
Start: 2018-09-18 | End: 2018-09-24 | Stop reason: HOSPADM

## 2018-09-18 RX ORDER — SUCCINYLCHOLINE CHLORIDE 20 MG/ML
INJECTION INTRAMUSCULAR; INTRAVENOUS AS NEEDED
Status: DISCONTINUED | OUTPATIENT
Start: 2018-09-18 | End: 2018-09-18 | Stop reason: HOSPADM

## 2018-09-18 RX ORDER — PRAVASTATIN SODIUM 20 MG/1
40 TABLET ORAL
Status: DISCONTINUED | OUTPATIENT
Start: 2018-09-18 | End: 2018-09-24 | Stop reason: HOSPADM

## 2018-09-18 RX ORDER — FLUMAZENIL 0.1 MG/ML
0.2 INJECTION INTRAVENOUS
Status: DISCONTINUED | OUTPATIENT
Start: 2018-09-18 | End: 2018-09-18 | Stop reason: HOSPADM

## 2018-09-18 RX ORDER — GLYCOPYRROLATE 0.2 MG/ML
INJECTION INTRAMUSCULAR; INTRAVENOUS AS NEEDED
Status: DISCONTINUED | OUTPATIENT
Start: 2018-09-18 | End: 2018-09-18 | Stop reason: HOSPADM

## 2018-09-18 RX ADMIN — Medication 2 G: at 13:16

## 2018-09-18 RX ADMIN — HYDROMORPHONE HYDROCHLORIDE 0.5 MG: 2 INJECTION, SOLUTION INTRAMUSCULAR; INTRAVENOUS; SUBCUTANEOUS at 15:34

## 2018-09-18 RX ADMIN — Medication 2 G: at 21:52

## 2018-09-18 RX ADMIN — SODIUM CHLORIDE, SODIUM LACTATE, POTASSIUM CHLORIDE, CALCIUM CHLORIDE: 600; 310; 30; 20 INJECTION, SOLUTION INTRAVENOUS at 13:11

## 2018-09-18 RX ADMIN — GLYCOPYRROLATE 0.6 MG: 0.2 INJECTION INTRAMUSCULAR; INTRAVENOUS at 15:14

## 2018-09-18 RX ADMIN — HYDROMORPHONE HYDROCHLORIDE 0.5 MG: 2 INJECTION, SOLUTION INTRAMUSCULAR; INTRAVENOUS; SUBCUTANEOUS at 15:22

## 2018-09-18 RX ADMIN — ROCURONIUM BROMIDE 10 MG: 10 INJECTION, SOLUTION INTRAVENOUS at 14:00

## 2018-09-18 RX ADMIN — HYDROMORPHONE HYDROCHLORIDE 0.5 MG: 2 INJECTION, SOLUTION INTRAMUSCULAR; INTRAVENOUS; SUBCUTANEOUS at 15:41

## 2018-09-18 RX ADMIN — FENTANYL CITRATE 50 MCG: 50 INJECTION, SOLUTION INTRAMUSCULAR; INTRAVENOUS at 12:52

## 2018-09-18 RX ADMIN — Medication: at 16:00

## 2018-09-18 RX ADMIN — HYDROMORPHONE HYDROCHLORIDE 0.5 MG: 2 INJECTION, SOLUTION INTRAMUSCULAR; INTRAVENOUS; SUBCUTANEOUS at 13:59

## 2018-09-18 RX ADMIN — ONDANSETRON 4 MG: 2 INJECTION INTRAMUSCULAR; INTRAVENOUS at 15:15

## 2018-09-18 RX ADMIN — SODIUM CHLORIDE, SODIUM LACTATE, POTASSIUM CHLORIDE, CALCIUM CHLORIDE: 600; 310; 30; 20 INJECTION, SOLUTION INTRAVENOUS at 14:30

## 2018-09-18 RX ADMIN — FENTANYL CITRATE 50 MCG: 50 INJECTION, SOLUTION INTRAMUSCULAR; INTRAVENOUS at 13:17

## 2018-09-18 RX ADMIN — PROPOFOL 160 MG: 10 INJECTION, EMULSION INTRAVENOUS at 13:01

## 2018-09-18 RX ADMIN — GABAPENTIN 900 MG: 300 CAPSULE ORAL at 21:51

## 2018-09-18 RX ADMIN — FENTANYL CITRATE 50 MCG: 50 INJECTION, SOLUTION INTRAMUSCULAR; INTRAVENOUS at 12:55

## 2018-09-18 RX ADMIN — ROCURONIUM BROMIDE 25 MG: 10 INJECTION, SOLUTION INTRAVENOUS at 13:28

## 2018-09-18 RX ADMIN — FENTANYL CITRATE 50 MCG: 50 INJECTION, SOLUTION INTRAMUSCULAR; INTRAVENOUS at 12:59

## 2018-09-18 RX ADMIN — ONDANSETRON 4 MG: 2 INJECTION, SOLUTION INTRAMUSCULAR; INTRAVENOUS at 22:20

## 2018-09-18 RX ADMIN — HYDROMORPHONE HYDROCHLORIDE 1 MG: 1 INJECTION, SOLUTION INTRAMUSCULAR; INTRAVENOUS; SUBCUTANEOUS at 15:57

## 2018-09-18 RX ADMIN — ROCURONIUM BROMIDE 10 MG: 10 INJECTION, SOLUTION INTRAVENOUS at 14:13

## 2018-09-18 RX ADMIN — SODIUM CHLORIDE 125 ML/HR: 900 INJECTION, SOLUTION INTRAVENOUS at 16:00

## 2018-09-18 RX ADMIN — PRAVASTATIN SODIUM 40 MG: 20 TABLET ORAL at 21:52

## 2018-09-18 RX ADMIN — MIDAZOLAM HYDROCHLORIDE 2 MG: 1 INJECTION, SOLUTION INTRAMUSCULAR; INTRAVENOUS at 12:52

## 2018-09-18 RX ADMIN — SODIUM CHLORIDE, SODIUM LACTATE, POTASSIUM CHLORIDE, AND CALCIUM CHLORIDE 125 ML/HR: 600; 310; 30; 20 INJECTION, SOLUTION INTRAVENOUS at 12:46

## 2018-09-18 RX ADMIN — SUCCINYLCHOLINE CHLORIDE 160 MG: 20 INJECTION INTRAMUSCULAR; INTRAVENOUS at 13:01

## 2018-09-18 RX ADMIN — NEOSTIGMINE METHYLSULFATE 3 MG: 1 INJECTION INTRAVENOUS at 15:14

## 2018-09-18 RX ADMIN — DEXAMETHASONE SODIUM PHOSPHATE 8 MG: 4 INJECTION, SOLUTION INTRA-ARTICULAR; INTRALESIONAL; INTRAMUSCULAR; INTRAVENOUS; SOFT TISSUE at 13:21

## 2018-09-18 RX ADMIN — ROCURONIUM BROMIDE 5 MG: 10 INJECTION, SOLUTION INTRAVENOUS at 13:00

## 2018-09-18 RX ADMIN — FENTANYL CITRATE 50 MCG: 50 INJECTION, SOLUTION INTRAMUSCULAR; INTRAVENOUS at 13:55

## 2018-09-18 RX ADMIN — PROPOFOL 40 MG: 10 INJECTION, EMULSION INTRAVENOUS at 13:17

## 2018-09-18 NOTE — IP AVS SNAPSHOT
[address] 
[phone] Patient: [place] MRN: [id number] [facility]:[birth year] A check leon indicates which time of day the medication should be taken. My Medications START taking these medications Instructions Each Dose to Equal  
 Morning Noon Evening Bedtime  
 gabapentin 300 mg capsule Commonly known as:  NEURONTIN Replaces:  gabapentin 600 mg tablet Your last dose was:  9/24/18 at 08:54 AM  
Your next dose is:  2 PM then at 10 PM  
   
 Take 1 capsule PO in the morning and afternoon, then take 3 capsules PO at bedtime Take 1 at 2PM  
   
 Take 3 at 10 PM  
  
 naloxone 4 mg/actuation nasal spray Commonly known as:  [employer] Notes to Patient:  Please read all instructions 1 [place] by IntraNASal route as needed. Apply 1 nasal spray to one nostril; may repeat every 2 to 3 minutes in alternating nostrils until medical assistance becomes available  Indications: OPIATE-INDUCED RESPIRATORY DEPRESSION, Opioid Toxicity 1 Spray  
    
   
   
   
  
 oxyCODONE IR 15 mg immediate release tablet Commonly known as:  OXY-IR Your last dose was:  9/24/18 at 08:53 AM  
Your next dose is: May be taken after 12:53 PM if needed for pain Take 1 Tab by mouth every four (4) hours as needed (post-operative pain). Max Daily Amount: 90 mg.  
 15 mg  
    
   
   
   
  
 senna-docusate 8.6-50 mg per tablet Commonly known as:  Ulroel Justin Your last dose was:  08:54 AM  
Your next dose is:  6 PM  
   
 Take 1 Tab by mouth two (2) times a day for 30 days. 1 Tab CHANGE how you take these medications Instructions Each Dose to Equal  
 Morning Noon Evening Bedtime  
 cyclobenzaprine 10 mg tablet Commonly known as:  FLEXERIL What changed:   
- medication strength 
- how much to take Your last dose was:  9/24/53   At  08:53 AM  
Your next dose is: May be taken after 4:54 PM if needed for muscle spasms Take 1 Tab by mouth three (3) times daily as needed for Muscle Spasm(s). 10 mg CONTINUE taking these medications Instructions Each Dose to Equal  
 Morning Noon Evening Bedtime  
 benazepril 10 mg tablet Commonly known as:  LOTENSIN Your last dose was:  08:33 Your next dose is:  Tomorrow Take 10 mg by mouth daily. 10 mg  
    
   
   
   
  
 pantoprazole 40 mg tablet Commonly known as:  PROTONIX Your last dose was:  06:46 AM  
Your next dose is:  Tomorrow AM 
  
   
 Take 40 mg by mouth daily. 40 mg  
    
   
   
   
  
 pravastatin 40 mg tablet Commonly known as:  PRAVACHOL Your last dose was:  9/23/18  09:10 Your next dose is: Tonight Take 40 mg by mouth nightly. 40 mg  
    
   
   
   
  
  
STOP taking these medications   
 gabapentin 600 mg tablet Commonly known as:  NEURONTIN Replaced by:  gabapentin 300 mg capsule  
   
  
 traMADol 50 mg tablet Commonly known as:  ULTRAM  
   
  
  
  
Where to Get Your Medications Information on where to get these meds will be given to you by the nurse or doctor. ! Ask your nurse or doctor about these medications  
  cyclobenzaprine 10 mg tablet  
 gabapentin 300 mg capsule  
 naloxone 4 mg/actuation nasal spray  
 oxyCODONE IR 15 mg immediate release tablet  
 senna-docusate 8.6-50 mg per tablet

## 2018-09-18 NOTE — OP NOTES
[address] [place], [address]    OPERATIVE REPORT    NAME: Dionne Adams    AGE: 61 y.o. YOB: 1955    MEDICAL RECORD NUMBER: [id number]    DATE OF SURGERY: 9/18/2018    OPERATIVE REPORT     PREOPERATIVE DIAGNOSIS: Cervical stenosis     POSTOPERATIVE DIAGNOSIS: Cervical stenosis     OPERATIVE PROCEDURE: C4 to C7 anterior cervical diskectomy and fusion with instrumentation and application of interbody spacer at C4-C5, C5-C6 and C6-C7. SURGEON: Sanjuana Hill MD     ASSISTANT: LEIDA Eng    ANESTHESIA: General    COMPLICATIONS: None    ESTIMATED BLOOD LOSS: 50    INSTRUMENTATION: K2M anterior cervical plate, Seaspine spacer    Specimens: none    NEUROMONITORING: SSEPs and spontaneous EMGs    INDICATION FOR PROCEDURE: The patient is a very pleasant 61 y.o. female with cervical stenosis. The patient elected to proceed with operative intervention. She was aware of the risks, benefits, and alternatives. She provided informed consent. PROCEDURE: The patient was identified in the preoperative holding area. The anterior cervical spine was marked by me. She was transferred to the operating room where general anesthesia was given. She was also given perioperative antibiotics. The patient was placed supine on the operating room table. All bony prominences were well-padded. The shoulders were taped. The anterior cervical spine was prepped and draped in the usual standard fashion. We performed a surgical time-out. I made a skin incision on the left side. It was transverse. I exposed the anterior cervical spine. I placed a needle into the disk space to verify our levels. I exposed the disc spaces with electrocautery from uncus to uncus. I brought in the operating room microscope. I performed a diskectomy at C5-C6. I decompressed the spinal cord and nerve roots bilaterally. I prepared the endplates to bleeding bone.  We had good hemostasis. I performed trial sizing. I placed a spacer into C5-C6 with the appropriate amount of tension and alignment. I performed an identical procedure at C6-C7 and C4-C5. The endplates were prepared to bleeding bone at each level. The spinal cord and nerve roots were decompressed. I placed a spacer into both C4-C5 and C6-C7 with the appropriate amount of tension and alignment. I then placed an anterior cervical plate into C4, C5, C6, and C7. The screws were locked to the plate according to the manufacture's specification. We had good hemostasis. I copiously irrigated the entire wound. I placed a deep drain. The wound was closed with 3-0 Vicryl and 4-0 Monocryl. A sterile dressing was applied. The patient was extubated and transferred to the recovery room in good medical condition. I, Dr. Chiquita Taylor, performed the above procedures.      Chiquita Taylor MD  9/18/2018

## 2018-09-18 NOTE — ANESTHESIA POSTPROCEDURE EVALUATION
Post-Anesthesia Evaluation and Assessment Patient: Amy Joiner MRN: [id number]  SSN: [id number] YOB: 1955  Age: 61 y.o. Sex: female Cardiovascular Function/Vital Signs Visit Vitals  /74  Pulse 77  Temp 36.7 °C (98 °F)  Resp 18  SpO2 98% Patient is status post general anesthesia for Procedure(s): 
C4-C7 ANTERIOR CERVICAL DISCECTOMY AND FUSION WITH INSTRUMENTATION. Nausea/Vomiting: None Postoperative hydration reviewed and adequate. Pain: 
Pain Scale 1: Numeric (0 - 10) (09/18/18 1642) Pain Intensity 1: 4 (09/18/18 1642) Managed Neurological Status:  
Neuro (WDL): Exceptions to WDL (09/18/18 1642) Neuro Neurologic State: Drowsy; Eyes open spontaneously; Eyes open to voice; Pharmacologically induced (comment) (09/18/18 1642) Orientation Level: Oriented to person;Oriented to place;Oriented to situation (09/18/18 1642) Cognition: Follows commands (09/18/18 1642) Speech: Clear;Delayed responses (09/18/18 1642) LUE Motor Response: Purposeful (09/18/18 1642) LLE Motor Response: Purposeful (09/18/18 1642) RUE Motor Response: Purposeful (09/18/18 1642) RLE Motor Response: Purposeful (09/18/18 1642) At baseline Mental Status and Level of Consciousness: Arousable Pulmonary Status:  
O2 Device: Nasal cannula (09/18/18 1642) Adequate oxygenation and airway patent Complications related to anesthesia: None Post-anesthesia assessment completed. No concerns Signed By: Criss Lopez MD   
 September 18, 2018

## 2018-09-18 NOTE — PROGRESS NOTES
Bedside shift change report given to Kimberly Plummer RN (oncoming nurse) by Jasmina Buckley RN (offgoing nurse). Report included the following information SBAR, Kardex and MAR.

## 2018-09-18 NOTE — PERIOP NOTES
TRANSFER - OUT REPORT:    Verbal report given to RNN [place]( on [place]  being transferred to [facility] for routine post - op       Report consisted of patients Situation, Background, Assessment and   Recommendations(SBAR). Information from the following report(s) SBAR, OR Summary and Procedure Summary was reviewed with the receiving nurse. Lines:   Peripheral IV 09/18/18 Left Antecubital (Active)   Site Assessment Clean, dry, & intact 9/18/2018  4:57 PM   Phlebitis Assessment 0 9/18/2018  4:57 PM   Infiltration Assessment 0 9/18/2018  4:57 PM   Dressing Status Clean, dry, & intact; Occlusive 9/18/2018  4:57 PM   Dressing Type Tape;Transparent 9/18/2018  4:57 PM   Hub Color/Line Status Pink;Capped 9/18/2018  4:57 PM   Alcohol Cap Used Yes 9/18/2018 12:45 PM       Peripheral IV 09/18/18 Right Forearm (Active)   Site Assessment Clean, dry, & intact 9/18/2018  4:57 PM   Phlebitis Assessment 0 9/18/2018  4:57 PM   Infiltration Assessment 0 9/18/2018  4:57 PM   Dressing Status Clean, dry, & intact; Occlusive 9/18/2018  4:57 PM   Dressing Type Tape;Transparent 9/18/2018  4:57 PM   Hub Color/Line Status Green; Infusing 9/18/2018  4:57 PM   Action Taken Other (comment) 9/18/2018  4:57 PM        Opportunity for questions and clarification was provided.       Patient transported with:   Monitor  O2 @ 2 liters  Registered Nurse

## 2018-09-18 NOTE — H&P
Date of Surgery Update: Fannie Dandy was seen and examined. History and physical has been reviewed. The patient has been examined.  There have been no significant clinical changes since the completion of the originally dated History and Physical.    Signed By: Devon Cantrell MD     September 18, 2018 12:14 PM

## 2018-09-18 NOTE — IP AVS SNAPSHOT
[facility] 
 
 
 [address] [facility] 
[phone] Patient: [place] MRN: [id number] VDI:6/67/5930 About your hospitalization You were admitted on:  September 18, 2018 You last received care in the:  [facility] 4M POST SURG ORT 1 You were discharged on:  September 24, 2018 Why you were hospitalized Your primary diagnosis was:  Not on File Your diagnoses also included:  Cervical Stenosis Of Spinal Canal  
  
Follow-up Information Follow up With Details Comments Contact Info Provider Unknown   Patient not available to ask [facility] [phone] [address] [phone] LEIDA Plasencia In 2 weeks As previously scheduled [address] [facility] [address] [facility] 
[phone] Discharge Orders None A check leon indicates which time of day the medication should be taken. My Medications START taking these medications Instructions Each Dose to Equal  
 Morning Noon Evening Bedtime  
 gabapentin 300 mg capsule Commonly known as:  NEURONTIN Replaces:  gabapentin 600 mg tablet Your last dose was:  9/24/18 at 08:54 AM  
Your next dose is:  2 PM then at 10 PM  
   
 Take 1 capsule PO in the morning and afternoon, then take 3 capsules PO at bedtime Take 1 at 2PM  
   
 Take 3 at 10 PM  
  
 naloxone 4 mg/actuation nasal spray Commonly known as:  [employer] Notes to Patient:  Please read all instructions 1 [place] by IntraNASal route as needed. Apply 1 nasal spray to one nostril; may repeat every 2 to 3 minutes in alternating nostrils until medical assistance becomes available  Indications: OPIATE-INDUCED RESPIRATORY DEPRESSION, Opioid Toxicity 1 Spray  
    
   
   
   
  
 oxyCODONE IR 15 mg immediate release tablet Commonly known as:  OXY-IR Your last dose was:  9/24/18 at 08:53 AM  
Your next dose is: May be taken after 12:53 PM if needed for pain Take 1 Tab by mouth every four (4) hours as needed (post-operative pain). Max Daily Amount: 90 mg.  
 15 mg  
    
   
   
   
  
 senna-docusate 8.6-50 mg per tablet Commonly known as:  Tempie Gloria Your last dose was:  08:54 AM  
Your next dose is:  6 PM  
   
 Take 1 Tab by mouth two (2) times a day for 30 days. 1 Tab CHANGE how you take these medications Instructions Each Dose to Equal  
 Morning Noon Evening Bedtime  
 cyclobenzaprine 10 mg tablet Commonly known as:  FLEXERIL What changed:   
- medication strength 
- how much to take Your last dose was:  9/24/53   At  08:53 AM  
Your next dose is: May be taken after 4:54 PM if needed for muscle spasms Take 1 Tab by mouth three (3) times daily as needed for Muscle Spasm(s). 10 mg CONTINUE taking these medications Instructions Each Dose to Equal  
 Morning Noon Evening Bedtime  
 benazepril 10 mg tablet Commonly known as:  LOTENSIN Your last dose was:  08:33 Your next dose is:  Tomorrow Take 10 mg by mouth daily. 10 mg  
    
   
   
   
  
 pantoprazole 40 mg tablet Commonly known as:  PROTONIX Your last dose was:  06:46 AM  
Your next dose is:  Tomorrow AM 
  
   
 Take 40 mg by mouth daily. 40 mg  
    
   
   
   
  
 pravastatin 40 mg tablet Commonly known as:  PRAVACHOL Your last dose was:  9/23/18  09:10 Your next dose is: Tonight Take 40 mg by mouth nightly. 40 mg  
    
   
   
   
  
  
STOP taking these medications   
 gabapentin 600 mg tablet Commonly known as:  NEURONTIN Replaced by:  gabapentin 300 mg capsule  
   
  
 traMADol 50 mg tablet Commonly known as:  ULTRAM  
   
  
  
  
Where to Get Your Medications Information on where to get these meds will be given to you by the nurse or doctor. !  Ask your nurse or doctor about these medications  
  cyclobenzaprine 10 mg tablet  
 gabapentin 300 mg capsule  
 naloxone 4 mg/actuation nasal spray  
 oxyCODONE IR 15 mg immediate release tablet  
 senna-docusate 8.6-50 mg per tablet Opioid Education Prescription Opioids: What You Need to Know: 
 
Prescription opioids can be used to help relieve moderate-to-severe pain and are often prescribed following a surgery or injury, or for certain health conditions. These medications can be an important part of treatment but also come with serious risks. Opioids are strong pain medicines. Examples include hydrocodone, oxycodone, fentanyl, and morphine. Heroin is an example of an illegal opioid. It is important to work with your health care provider to make sure you are getting the safest, most effective care. WHAT ARE THE RISKS AND SIDE EFFECTS OF OPIOID USE? Prescription opioids carry serious risks of addiction and overdose, especially with prolonged use. An opioid overdose, often marked by slow breathing, can cause sudden death. The use of prescription opioids can have a number of side effects as well, even when taken as directed. · Tolerance-meaning you might need to take more of a medication for the same pain relief · Physical dependence-meaning you have symptoms of withdrawal when the medication is stopped. Withdrawal symptoms can include nausea, sweating, chills, diarrhea, stomach cramps, and muscle aches. Withdrawal can last up to several weeks, depending on which drug you took and how long you took it. · Increased sensitivity to pain · Constipation · Nausea, vomiting, and dry mouth · Sleepiness and dizziness · Confusion · Depression · Low levels of testosterone that can result in lower sex drive, energy, and strength · Itching and sweating RISKS ARE GREATER WITH:      
· History of drug misuse, substance use disorder, or overdose · Mental health conditions (such as depression or anxiety) · Sleep apnea · Older age (72 years or older) · Pregnancy Avoid alcohol while taking prescription opioids. Also, unless specifically advised by your health care provider, medications to avoid include: · Benzodiazepines (such as Xanax or Valium) · Muscle relaxants (such as Soma or Flexeril) · Hypnotics (such as Ambien or Lunesta) · Other prescription opioids KNOW YOUR OPTIONS Talk to your health care provider about ways to manage your pain that don't involve prescription opioids. Some of these options may actually work better and have fewer risks and side effects. Consult your physician before adding or stopping any medications, treatments, or physical activity. Options may include: 
· Pain relievers such as acetaminophen, ibuprofen, and naproxen · Some medications that are also used for depression or seizures · Physical therapy and exercise · Counseling to help patients learn how to cope better with triggers of pain and stress. · Application of heat or cold compress · Massage therapy · Relaxation techniques Be Informed Make sure you know the name of your medication, how much and how often to take it, and its potential risks & side effects. IF YOU ARE PRESCRIBED OPIOIDS FOR PAIN: 
· Never take opioids in greater amounts or more often than prescribed. Remember the goal is not to be pain-free but to manage your pain at a tolerable level. · Follow up with your primary care provider to: · Work together to create a plan on how to manage your pain. · Talk about ways to help manage your pain that don't involve prescription opioids. · Talk about any and all concerns and side effects. · Help prevent misuse and abuse. · Never sell or share prescription opioids · Help prevent misuse and abuse. · Store prescription opioids in a secure place and out of reach of others (this may include visitors, children, friends, and family).  
· Safely dispose of unused/unwanted prescription opioids: Find your community drug take-back program or your pharmacy mail-back program, or flush them down the toilet, following guidance from the Food and Drug Administration (www.fda.gov/Drugs/ResourcesForYou). · Visit www.cdc.gov/drugoverdose to learn about the risks of opioid abuse and overdose. · If you believe you may be struggling with addiction, tell your health care provider and ask for guidance or call [employer] at 5-534-012-UJJA. Discharge Instructions Gela Alcantar MD 
[address] [facility] Office Phone: [phone] Neck Surgery Discharge Instructions Activities  You are going home a well person, be as active as possible. Your only exercise should be walking. Start with short frequent walks and increase your walking distance each day. Start with walking twice a day for 5 minutes and increase your distance each day 2-3 minutes until you reach 25 minutes twice a day. Limit the amount of time you sit to 20-30 minute intervals. Sitting for prolonged periods of time will be uncomfortable for you following your surgery.  Do not lift anything over five pounds, and do not do any bending or straining.  Avoid reaching overhead in this post-operative period  Do not do any neck exercises until you have been instructed by your doctor.  When you are in the bed, you may lay on your back or on either side. Do not lie on your stomach.  Continue using your incentive spirometer regularly for deep breathing exercises  You may resume sexual relations 3-4 weeks after your surgery, depending on how you are feeling. Diet  You may resume your normal diet. If your throat is sore, you may want to eat soft foods for a few days. Be sure to drink plenty of fluids, it is important to keep yourself hydrated. If you begin having trouble swallowing, call the office immediately.  
 Avoid alcoholic beverages and ABSOLUTELY NO tobacco products. Tobacco products will interfere with your healing. If you continue to use tobacco, you may end up needing another surgery in the future. Medications  Do not take anti-inflammatory medications or aspirin unless instructed by your physician.  Take your pain medication as directed.  Do NOT take additional Tylenol if your prescribed pain medication has acetaminophen in it (Endocet/Percocet, Lortab, Norco).  It is important to have regular bowel movements. Pain medications may cause constipation. Stool softeners, prune juice, and increasing your water and fiber intake may help in preventing constipation.  Do NOT take laxatives if at all possible except in severe situations. It can results in a vicious cycle of constipation and diarrhea.  Do not be alarmed if you still have some of the same symptoms you had prior to surgery. The nerves often require time to heal after the pressure has been relieved. You may experience pain in your shoulders or between your shoulder blades, which is common after this surgery. The level of pain you experience should improve as your body heals. Driving  You may not drive or return to work until instructed by your physician. However, you may ride in the car for short periods of time. Neck collar  Wear your neck brace. You may remove it for short breaks, when eating or showering. You must keep the brace on while sleeping and when ambulating. Showering  You may shower in approximately 5 days after your surgery if your incision is not draining.  You may remove your brace during showers.  Do not rub or apply lotion or ointments to the incision site.  Do not use tub baths, swimming pools or Jacuzzis. Caring for your incision  Keep the clear, plastic dressing on until 3 days after surgery. At that point, if the incision is dry and without drainage, you may keep the wound open to air without cover.   
 You may have steri-strips on your incision (small, white pieces of tape). Do not pull the steri-strips; they will fall off on their own after several days. If you have sutures or staples, they will be removed by home health or when you see your physician.  Do not rub or apply any lotions or ointments to your incision site. Follow Up 
 Once you are home, call your physicians office to schedule an appointment 2-3 weeks after surgery. Notify your physician if you develop any of the following conditions: 
 Fever above 101 degrees for 24 hours.  Nausea or vomiting.  Severe headache.  Inability to urinate.  Loss of bowel or bladder control (sudden onset of incontinence).  Changes in sensation in your extremities (numbness, tingling, loss of color).  Severe pain or pain not relieved by medications.  Redness, swelling, or drainage from your incision.  Persistent pain in the chest.  
 Pain in the calf of either leg.  Increased weakness (if this is greater than before your surgery). If you have any questions, contact your Orthopaedic Surgeons office. OFFICE OF DR. Kim Phelps   [phone] [place] [address] [address], [address], [address], [address] [facility] [address] * WEAR YOUR BRACE AS ADVISED * NO DRIVING UNTIL YOU ARE CLEARED TO DO SO BY YOUR SURGEON 
 
* LIMIT LIFTING, BENDING AND TWISTING. NO LIFTING MORE THAN 5 LBS * MAKE SURE YOU ARE GETTING GOOD NUTRITION (Lean Protein, Vitamin D AND Calcium) * DO NOT TAKE ANY NSAIDs FOR THE FIRST 3 MONTHS AFTER SURGERY (such as Advil/Ibuprofen/Motrin, Aleve/Naproxen/Naprosyn, Diclofenac, Celebrex, Meloxicam, Indomethacin, Goody's powder, BC powder etc.) * NO NICOTINE PRODUCTS * FULLY READ YOUR DISCHARGE INSTRUCTIONS [employer] Announcement We are excited to announce that we are making your provider's discharge notes available to you in [employer].   You will see these notes when they are completed and signed by the physician that discharged you from your recent hospital stay. If you have any questions or concerns about any information you see in The Runthrough, please call the Health Information Department where you were seen or reach out to your Primary Care Provider for more information about your plan of care. Introducing [facility]! [facility] introduces The Runthrough patient portal. Now you can access parts of your medical record, email your doctor's office, and request medication refills online. 1. In your internet browser, go to [email or web address] 2. Click on the First Time User? Click Here link in the Sign In box. You will see the New Member Sign Up page. 3. Enter your The Runthrough Access Code exactly as it appears below. You will not need to use this code after youve completed the sign-up process. If you do not sign up before the expiration date, you must request a new code. · The Runthrough Access Code: [email or web address] Expires: 12/9/2018  8:30 AM 
 
4. Enter the last four digits of your Social Security Number (xxxx) and Date of Birth (mm/dd/yyyy) as indicated and click Submit. You will be taken to the next sign-up page. 5. Create a The Runthrough ID. This will be your The Runthrough login ID and cannot be changed, so think of one that is secure and easy to remember. 6. Create a The Runthrough password. You can change your password at any time. 7. Enter your Password Reset Question and Answer. This can be used at a later time if you forget your password. 8. Enter your e-mail address. You will receive e-mail notification when new information is available in [address]. 9. Click Sign Up. You can now view and download portions of your medical record. 10. Click the Download Summary menu link to download a portable copy of your medical information. If you have questions, please visit the Frequently Asked Questions section of the The Runthrough website. Remember, The Runthrough is NOT to be used for urgent needs. For medical emergencies, dial 911. Now available from your iPhone and Android! Introducing Leland Thayer As a [address] [place] [address] patient, I wanted to make you aware of our electronic visit tool called Leland Thayer. [facility] [address] 24/7 allows you to connect within minutes with a medical provider 24 hours a day, seven days a week via a mobile device or tablet or logging into a secure website from your computer. You can access Leland Thayer from anywhere in the [place]. A virtual visit might be right for you when you have a simple condition and feel like you just dont want to get out of bed, or cant get away from work for an appointment, when your regular [address] [place] [address] provider is not available (evenings, weekends or holidays), or when youre out of town and need minor care. Electronic visits cost only $49 and if the [facility] 24[facility] provider determines a prescription is needed to treat your condition, one can be electronically transmitted to a nearby pharmacy*. Please take a moment to enroll today if you have not already done so. The enrollment process is free and takes just a few minutes. To enroll, please download the [employer] 24/7 jae to your tablet or phone, or visit [email or web address] to enroll on your computer. And, as an [address] [place] [address] patient with a [employer] account, the results of your visits will be scanned into your electronic medical record and your primary care provider will be able to view the scanned results. We urge you to continue to see your regular [facility] [address] [address] provider for your ongoing medical care. And while your primary care provider may not be the one available when you seek a Leland Thayer virtual visit, the peace of mind you get from getting a real diagnosis real time can be priceless. For more information on Leland Thayer, view our Frequently Asked Questions (FAQs) at [email or web address]. Sincerely, 
 
Kvng Eason MD 
Chief Medical Officer Concha Avendaño [address] [facility] *:  certain medications cannot be prescribed via Leland Thayer Providers Seen During Your Hospitalization Provider Specialty Primary office phone Cristopher Beavers MD Orthopedic Surgery [phone] Your Primary Care Physician (PCP) Primary Care Physician Office Phone Office Fax UNKNOWN, PROVIDER ** None ** ** None ** You are allergic to the following Allergen Reactions Codeine Other (comments) Feels confused, \"makes me feel like I'm going out my head\" Pcn (Penicillins) Itching Tomato Itching Recent Documentation Weight BMI OB Status Smoking Status 87.7 kg 35.94 kg/m2 Hysterectomy Never Smoker Emergency Contacts Name Discharge Info Relation Home Work Mobile Joselo Adams DISCHARGE CAREGIVER [3] Spouse [3]   [phone] Neomí Adams DISCHARGE CAREGIVER [3] Other Relative [6]   [phone] [facility] DISCHARGE CAREGIVER [3] Son [22]   [phone] Patient Belongings The following personal items are in your possession at time of discharge: 
  Dental Appliances: None  Visual Aid: Glasses, At bedside      Home Medications: None   Jewelry: None  Clothing: Other (comment) (street clothes to [employer])    Other Valuables: None Please provide this summary of care documentation to your next provider. Signatures-by signing, you are acknowledging that this After Visit Summary has been reviewed with you and you have received a copy. Patient Signature:  ____________________________________________________________ Date:  ____________________________________________________________  
  
Angel Khan Provider Signature:  ____________________________________________________________ Date:  ____________________________________________________________

## 2018-09-18 NOTE — ANESTHESIA PREPROCEDURE EVALUATION
Anesthetic History No history of anesthetic complications PONV Review of Systems / Medical History Patient summary reviewed, nursing notes reviewed and pertinent labs reviewed Pulmonary Within defined limits Sleep apnea Neuro/Psych Within defined limits Cardiovascular Within defined limits Hypertension GI/Hepatic/Renal 
Within defined limits GERD: well controlled Endo/Other Within defined limits Morbid obesity and arthritis Other Findings Physical Exam 
 
Airway Mallampati: II 
TM Distance: 4 - 6 cm Neck ROM: normal range of motion Mouth opening: Normal 
 
 Cardiovascular Rhythm: regular Rate: normal 
 
 
 
 Dental 
No notable dental hx Pulmonary Breath sounds clear to auscultation Abdominal 
 
 
 
 Other Findings Anesthetic Plan ASA: 2 Anesthesia type: general 
 
 
 
 
 
Anesthetic plan and risks discussed with: Patient

## 2018-09-19 LAB
ANION GAP SERPL CALC-SCNC: 9 MMOL/L (ref 5–15)
BUN SERPL-MCNC: 9 MG/DL (ref 6–20)
BUN/CREAT SERPL: 11 (ref 12–20)
CALCIUM SERPL-MCNC: 8.6 MG/DL (ref 8.5–10.1)
CHLORIDE SERPL-SCNC: 108 MMOL/L (ref 97–108)
CO2 SERPL-SCNC: 25 MMOL/L (ref 21–32)
CREAT SERPL-MCNC: 0.79 MG/DL (ref 0.55–1.02)
GLUCOSE SERPL-MCNC: 123 MG/DL (ref 65–100)
HGB BLD-MCNC: 11.8 G/DL (ref 11.5–16)
POTASSIUM SERPL-SCNC: 4.3 MMOL/L (ref 3.5–5.1)
SODIUM SERPL-SCNC: 142 MMOL/L (ref 136–145)

## 2018-09-19 PROCEDURE — 74011250637 HC RX REV CODE- 250/637: Performed by: ORTHOPAEDIC SURGERY

## 2018-09-19 PROCEDURE — 74011000250 HC RX REV CODE- 250: Performed by: ORTHOPAEDIC SURGERY

## 2018-09-19 PROCEDURE — 74011250636 HC RX REV CODE- 250/636: Performed by: ORTHOPAEDIC SURGERY

## 2018-09-19 PROCEDURE — 74011250637 HC RX REV CODE- 250/637: Performed by: NURSE PRACTITIONER

## 2018-09-19 PROCEDURE — 36415 COLL VENOUS BLD VENIPUNCTURE: CPT | Performed by: ORTHOPAEDIC SURGERY

## 2018-09-19 PROCEDURE — G8988 SELF CARE GOAL STATUS: HCPCS | Performed by: PHYSICAL THERAPIST

## 2018-09-19 PROCEDURE — 97530 THERAPEUTIC ACTIVITIES: CPT | Performed by: PHYSICAL THERAPIST

## 2018-09-19 PROCEDURE — 74011250636 HC RX REV CODE- 250/636: Performed by: PHYSICIAN ASSISTANT

## 2018-09-19 PROCEDURE — 80048 BASIC METABOLIC PNL TOTAL CA: CPT | Performed by: ORTHOPAEDIC SURGERY

## 2018-09-19 PROCEDURE — 99218 HC RM OBSERVATION: CPT

## 2018-09-19 PROCEDURE — 97163 PT EVAL HIGH COMPLEX 45 MIN: CPT | Performed by: PHYSICAL THERAPIST

## 2018-09-19 PROCEDURE — 74011250636 HC RX REV CODE- 250/636: Performed by: NURSE PRACTITIONER

## 2018-09-19 PROCEDURE — 85018 HEMOGLOBIN: CPT | Performed by: ORTHOPAEDIC SURGERY

## 2018-09-19 PROCEDURE — G8987 SELF CARE CURRENT STATUS: HCPCS | Performed by: PHYSICAL THERAPIST

## 2018-09-19 PROCEDURE — 94760 N-INVAS EAR/PLS OXIMETRY 1: CPT

## 2018-09-19 PROCEDURE — 97110 THERAPEUTIC EXERCISES: CPT | Performed by: PHYSICAL THERAPIST

## 2018-09-19 RX ORDER — OXYCODONE HYDROCHLORIDE 5 MG/1
5-10 TABLET ORAL
Qty: 60 TAB | Refills: 0 | Status: SHIPPED | OUTPATIENT
Start: 2018-09-19 | End: 2018-09-24

## 2018-09-19 RX ORDER — DEXAMETHASONE SODIUM PHOSPHATE 10 MG/ML
10 INJECTION INTRAMUSCULAR; INTRAVENOUS ONCE
Status: COMPLETED | OUTPATIENT
Start: 2018-09-19 | End: 2018-09-19

## 2018-09-19 RX ORDER — HYDROMORPHONE HYDROCHLORIDE 2 MG/1
4 TABLET ORAL
Status: DISCONTINUED | OUTPATIENT
Start: 2018-09-19 | End: 2018-09-20

## 2018-09-19 RX ORDER — DEXAMETHASONE SODIUM PHOSPHATE 10 MG/ML
10 INJECTION INTRAMUSCULAR; INTRAVENOUS ONCE
Status: COMPLETED | OUTPATIENT
Start: 2018-09-20 | End: 2018-09-19

## 2018-09-19 RX ORDER — AMOXICILLIN 250 MG
1 CAPSULE ORAL 2 TIMES DAILY
Qty: 60 TAB | Refills: 0 | Status: SHIPPED | OUTPATIENT
Start: 2018-09-19 | End: 2018-10-19

## 2018-09-19 RX ORDER — GABAPENTIN 300 MG/1
300 CAPSULE ORAL 2 TIMES DAILY
Status: DISCONTINUED | OUTPATIENT
Start: 2018-09-19 | End: 2018-09-24 | Stop reason: HOSPADM

## 2018-09-19 RX ORDER — HYDROMORPHONE HYDROCHLORIDE 2 MG/1
2 TABLET ORAL
Status: DISCONTINUED | OUTPATIENT
Start: 2018-09-19 | End: 2018-09-20

## 2018-09-19 RX ADMIN — PRAVASTATIN SODIUM 40 MG: 20 TABLET ORAL at 23:43

## 2018-09-19 RX ADMIN — GABAPENTIN 300 MG: 300 CAPSULE ORAL at 13:41

## 2018-09-19 RX ADMIN — GABAPENTIN 900 MG: 300 CAPSULE ORAL at 23:43

## 2018-09-19 RX ADMIN — Medication 2 G: at 11:23

## 2018-09-19 RX ADMIN — Medication 10 ML: at 13:42

## 2018-09-19 RX ADMIN — OXYCODONE HYDROCHLORIDE 10 MG: 5 TABLET ORAL at 11:21

## 2018-09-19 RX ADMIN — PANTOPRAZOLE SODIUM 40 MG: 40 TABLET, DELAYED RELEASE ORAL at 06:56

## 2018-09-19 RX ADMIN — DEXAMETHASONE SODIUM PHOSPHATE 10 MG: 10 INJECTION, SOLUTION INTRAMUSCULAR; INTRAVENOUS at 16:22

## 2018-09-19 RX ADMIN — BENAZEPRIL HYDROCHLORIDE 10 MG: 10 TABLET ORAL at 08:18

## 2018-09-19 RX ADMIN — DEXAMETHASONE SODIUM PHOSPHATE 10 MG: 10 INJECTION, SOLUTION INTRAMUSCULAR; INTRAVENOUS at 08:18

## 2018-09-19 RX ADMIN — Medication 2 G: at 04:00

## 2018-09-19 RX ADMIN — DEXAMETHASONE SODIUM PHOSPHATE 10 MG: 10 INJECTION, SOLUTION INTRAMUSCULAR; INTRAVENOUS at 23:53

## 2018-09-19 RX ADMIN — POLYETHYLENE GLYCOL 3350 17 G: 17 POWDER, FOR SOLUTION ORAL at 08:18

## 2018-09-19 RX ADMIN — Medication 10 ML: at 23:53

## 2018-09-19 RX ADMIN — Medication 1 TABLET: at 19:05

## 2018-09-19 RX ADMIN — Medication 1 TABLET: at 08:18

## 2018-09-19 NOTE — PROGRESS NOTES
Patient having trouble raising right arm due to pain in arm and hands, c/o pain in left hand, Dr. Dora Barreto aware, concerned for patient safety, per NP ordered PT, OT consult for today. 1307-patient stated oxycodone did not relieve the nerve pain, spoke with Scottie Marion, new order for gabapentin 300mg bid along with dose already scheduled for evening, give additional dose of decadron 10mg at 1600, will continue to assess.

## 2018-09-19 NOTE — DISCHARGE INSTRUCTIONS
Raul Mata MD  [address] [facility]  Office Phone: [phone]  Neck Surgery Discharge Instructions  Activities   You are going home a well person, be as active as possible. Your only exercise should be walking. Start with short frequent walks and increase your walking distance each day. Start with walking twice a day for 5 minutes and increase your distance each day 2-3 minutes until you reach 25 minutes twice a day. Limit the amount of time you sit to 20-30 minute intervals. Sitting for prolonged periods of time will be uncomfortable for you following your surgery.  Do not lift anything over five pounds, and do not do any bending or straining.  Avoid reaching overhead in this post-operative period   Do not do any neck exercises until you have been instructed by your doctor.  When you are in the bed, you may lay on your back or on either side. Do not lie on your stomach.  Continue using your incentive spirometer regularly for deep breathing exercises   You may resume sexual relations 3-4 weeks after your surgery, depending on how you are feeling. Diet   You may resume your normal diet. If your throat is sore, you may want to eat soft foods for a few days. Be sure to drink plenty of fluids, it is important to keep yourself hydrated. If you begin having trouble swallowing, call the office immediately.  Avoid alcoholic beverages and ABSOLUTELY NO tobacco products. Tobacco products will interfere with your healing. If you continue to use tobacco, you may end up needing another surgery in the future. Medications   Do not take anti-inflammatory medications or aspirin unless instructed by your physician.  Take your pain medication as directed.  Do NOT take additional Tylenol if your prescribed pain medication has acetaminophen in it (Endocet/Percocet, Lortab, Norco).  It is important to have regular bowel movements. Pain medications may cause constipation.   Stool softeners, prune juice, and increasing your water and fiber intake may help in preventing constipation.  Do NOT take laxatives if at all possible except in severe situations. It can results in a vicious cycle of constipation and diarrhea.  Do not be alarmed if you still have some of the same symptoms you had prior to surgery. The nerves often require time to heal after the pressure has been relieved. You may experience pain in your shoulders or between your shoulder blades, which is common after this surgery. The level of pain you experience should improve as your body heals. Driving   You may not drive or return to work until instructed by your physician. However, you may ride in the car for short periods of time. Neck collar   Wear your neck brace. You may remove it for short breaks, when eating or showering. You must keep the brace on while sleeping and when ambulating. Showering   You may shower in approximately 5 days after your surgery if your incision is not draining.  You may remove your brace during showers.  Do not rub or apply lotion or ointments to the incision site.  Do not use tub baths, swimming pools or Jacuzzis. Caring for your incision   Keep the clear, plastic dressing on until 3 days after surgery. At that point, if the incision is dry and without drainage, you may keep the wound open to air without cover.  You may have steri-strips on your incision (small, white pieces of tape). Do not pull the steri-strips; they will fall off on their own after several days. If you have sutures or staples, they will be removed by home health or when you see your physician.  Do not rub or apply any lotions or ointments to your incision site. Follow Up   Once you are home, call your physicians office to schedule an appointment 2-3 weeks after surgery. Notify your physician if you develop any of the following conditions:   Fever above 101 degrees for 24 hours.    Nausea or vomiting.  Severe headache.  Inability to urinate.  Loss of bowel or bladder control (sudden onset of incontinence).  Changes in sensation in your extremities (numbness, tingling, loss of color).  Severe pain or pain not relieved by medications.  Redness, swelling, or drainage from your incision.  Persistent pain in the chest.    Pain in the calf of either leg.  Increased weakness (if this is greater than before your surgery). If you have any questions, contact your Orthopaedic Surgeons office. OFFICE OF DR. Sameera Benjamin   [phone]  [place] [address], [address], [address] [facility], [address] [facility]     * WEAR YOUR BRACE AS ADVISED    * NO DRIVING UNTIL YOU ARE CLEARED TO DO SO BY YOUR SURGEON    * LIMIT LIFTING, BENDING AND TWISTING.  NO LIFTING MORE THAN 5 LBS    * MAKE SURE YOU ARE GETTING GOOD NUTRITION (Lean Protein, Vitamin D AND Calcium)    * DO NOT TAKE ANY NSAIDs FOR THE FIRST 3 MONTHS AFTER SURGERY (such as Advil/Ibuprofen/Motrin, Aleve/Naproxen/Naprosyn, Diclofenac, Celebrex, Meloxicam, Indomethacin, Goody's powder, BC powder etc.)    * NO NICOTINE PRODUCTS    * FULLY READ YOUR DISCHARGE INSTRUCTIONS

## 2018-09-19 NOTE — PROGRESS NOTES
Problem: Mobility Impaired (Adult and Pediatric)  Goal: *Acute Goals and Plan of Care (Insert Text)  Physical Therapy Goals  Initiated 9/19/2018  1. Patient will move from supine to sit and sit to supine  in bed with minimal assistance/contact guard assist within 7 day(s). 2.  Patient will transfer from bed to chair and chair to bed with minimal assistance/contact guard assist using the least restrictive device within 7 day(s). 3.  Patient will perform sit to stand with minimal assistance/contact guard assist within 7 day(s). 4.  Patient will ambulate with minimal assistance/contact guard assist for 100 feet with the least restrictive device within 7 day(s). 5.  Patient will ascend/descend 4 stairs with 1 handrail(s) with minimal assistance/contact guard assist within 7 day(s). physical Therapy EVALUATION  Patient: Yuridia Collins (64 y.o. female)  Date: 9/19/2018  Primary Diagnosis: Acute cervical radiculopathy [M54.12]  Cervical spinal stenosis [M48.02]  Procedure(s) (LRB):  C4-C7 ANTERIOR CERVICAL DISCECTOMY AND FUSION WITH INSTRUMENTATION (N/A) 1 Day Post-Op   Precautions: fall       ASSESSMENT :  Based on the objective data described below, the patient presents with new RUE weakness; severe pain; hypesthesia with inability to tolerate touch; poor shoulder strength (L 2+; R 0) with inability to move  antigravity functionally, or grasp spoon, fork; near total dependence in bed mobility; poor sitting and standing balance and general deconditioning. Ms Lawyer Crouch required max A for bed mobility, sit-stand & transfer. Once up she requires min A/ close contact for safety; balance impaired. She is presently unable to use a walker due to poor grasp. Prior to admit she was indep in ADl, tho required a straight cane for safe ambulation following THR. She lives with her partner in a one story home with 4 [address]; rail. She has an active life; she is a [employer] .  She has a positive attitude and puts forth good effort. If this weakness does not resolve, she will need rehab at discharge. Patient will benefit from skilled intervention to address the above impairments. Patients rehabilitation potential is considered to be Excellent  Factors which may influence rehabilitation potential include:   []         None noted  []         Mental ability/status  [x]         Medical condition  []         Home/family situation and support systems  []         Safety awareness  []         Pain tolerance/management  []         Other:      PLAN :  Recommendations and Planned Interventions:  [x]           Bed Mobility Training             [x]    Neuromuscular Re-Education  [x]           Transfer Training                   []    Orthotic/Prosthetic Training  [x]           Gait Training                         []    Modalities  [x]           Therapeutic Exercises           [x]    Edema Management/Control  [x]           Therapeutic Activities            [x]    Patient and Family Training/Education  []           Other (comment):    Frequency/Duration: Patient will be followed by physical therapy  daily to address goals.   Discharge Recommendations: Rehab possible   Further Equipment Recommendations for Discharge: tbd     SUBJECTIVE:   Patient stated I couldn't use the L hand, but now I can't use the R.    OBJECTIVE DATA SUMMARY:   HISTORY:    Past Medical History:   Diagnosis Date    Arthritis     GERD (gastroesophageal reflux disease)     H/O degenerative disc disease     Hyperlipemia     Hypertension     Nausea & vomiting     Obesity (BMI 30-39.9) 09/10/2018    BMI= 36    Sleep apnea     CPAP- has not used since weight loss    Vertigo      Past Surgical History:   Procedure Laterality Date    ABDOMEN SURGERY PROC UNLISTED  1989    Abdominal wall surgery after childbirth    HX COLONOSCOPY      HX HEART CATHETERIZATION  2014    Patient states no blockages found    HX HYSTERECTOMY  1999    HX ORTHOPAEDIC Left 2014 hip replacement     Prior Level of Function/Home Situation: see above  Personal factors and/or comorbidities impacting plan of care: overweight & deconditioned    Home Situation  Home Environment: Private residence  # Steps to Enter: 3  One/Two Story Residence: One story  Living Alone: No  Support Systems: Spouse/Significant Other/Partner  Patient Expects to be Discharged to[de-identified] Private residence  Current DME Used/Available at Home: None    EXAMINATION/PRESENTATION/DECISION MAKING:   Critical Behavior:  Neurologic State: Alert  Orientation Level: Oriented X4  Cognition: Appropriate decision making, Appropriate for age attention/concentration, Appropriate safety awareness     Hearing: Auditory  Auditory Impairment: None  Skin:  nt  Edema: dependent hand R  Range Of Motion:  AROM: Grossly decreased, non-functional (UEs)           PROM: Grossly decreased, non-functional (UEs-cannot tolerate ROM)           Strength:    Strength: Grossly decreased, non-functional (UEs)                    Tone & Sensation:                  Sensation: Impaired (hypersensitive)               Coordination:  Coordination: Grossly decreased, non-functional (UEs)  Vision:      Functional Mobility:  Bed Mobility:  Rolling: Maximum assistance  Supine to Sit: Maximum assistance (HOB elevated)     Scooting: Total assistance  Transfers:  Sit to Stand: Maximum assistance  Stand to Sit: Maximum assistance  Stand Pivot Transfers: Maximum assistance                    Balance:   Sitting: Impaired  Sitting - Static: Fair (occasional)  Sitting - Dynamic: Poor (constant support)  Ambulation/Gait Training:  Distance (ft): 2 Feet (ft)  Assistive Device:  (forearm support)  Ambulation - Level of Assistance: Moderate assistance     Gait Description (WDL): Exceptions to WDL  Gait Abnormalities: Trunk sway increased; Altered arm swing        Base of Support: Widened     Speed/Lauryn: Slow  Step Length: Right shortened;Right lengthened Functional Measure:  Barthel Index:    Bathin  Bladder: 10  Bowels: 10  Groomin  Dressin  Feedin  Mobility: 0  Stairs: 0  Toilet Use: 0  Transfer (Bed to Chair and Back): 5  Total: 25       Barthel and G-code impairment scale:  Percentage of impairment CH  0% CI  1-19% CJ  20-39% CK  40-59% CL  60-79% CM  80-99% CN  100%   Barthel Score 0-100 100 99-80 79-60 59-40 20-39 1-19   0   Barthel Score 0-20 20 17-19 13-16 9-12 5-8 1-4 0      The Barthel ADL Index: Guidelines  1. The index should be used as a record of what a patient does, not as a record of what a patient could do. 2. The main aim is to establish degree of independence from any help, physical or verbal, however minor and for whatever reason. 3. The need for supervision renders the patient not independent. 4. A patient's performance should be established using the best available evidence. Asking the patient, friends/relatives and nurses are the usual sources, but direct observation and common sense are also important. However direct testing is not needed. 5. Usually the patient's performance over the preceding 24-48 hours is important, but occasionally longer periods will be relevant. 6. Middle categories imply that the patient supplies over 50 per cent of the effort. 7. Use of aids to be independent is allowed. Shelli Bass., Barthel, D.W. (1655). Functional evaluation: the Barthel Index. [address] [facility] (14)2. CRISTINA ChristineJRockMCHLOE, Henry Young.[facility], [address] [place] [address] (). Measuring the change indisability after inpatient rehabilitation; comparison of the responsiveness of the Barthel Index and Functional [place] Measure. Journal of Neurology, Neurosurgery, and Psychiatry, 66(4), 416-789. Jet Herring N.J.A, YURI Reis, & Moira Franco MRockA. (2004.) Assessment of post-stroke quality of life in cost-effectiveness studies: The usefulness of the Barthel Index and the EuroQoL-5D.  Quality of Life Research, 13, 306-87       G codes: In compliance with CMSs Claims Based Outcome Reporting, the following G-code set was chosen for this patient based on their primary functional limitation being treated: The outcome measure chosen to determine the severity of the functional limitation was the barthel with a score of 25/100 which was correlated with the impairment scale. ? Self Care:     - CURRENT STATUS: CL - 60%-79% impaired, limited or restricted    - GOAL STATUS: CK - 40%-59% impaired, limited or restricted    - D/C STATUS:  ---------------To be determined---------------      Physical Therapy Evaluation Charge Determination   History Examination Presentation Decision-Making   MEDIUM  Complexity : 1-2 comorbidities / personal factors will impact the outcome/ POC  HIGH Complexity : 4+ Standardized tests and measures addressing body structure, function, activity limitation and / or participation in recreation  MEDIUM Complexity : Evolving with changing characteristics  Other outcome measures barthel  MEDIUM      Based on the above components, the patient evaluation is determined to be of the following complexity level:high    Pain:  Pain Scale 1: Numeric (0 - 10)  Pain Intensity 1: 8  Pain Location 1: Arm;Hand  Pain Orientation 1: Left;Right  Pain Description 1: Sharp  Pain Intervention(s) 1: Medication (see MAR)  Activity Tolerance:   Limited by pain  Please refer to the flowsheet for vital signs taken during this treatment.   After treatment:   []         Patient left in no apparent distress sitting up in chair  [x]         Patient left in no apparent distress in bed  [x]         Call bell left within reach  [x]         Nursing notified  []         Caregiver present  []         Bed alarm activated    COMMUNICATION/EDUCATION:   The patients plan of care was discussed with: Physical Therapist, Occupational Therapist, Registered Nurse and Certified Nursing Assistant/Patient Care Technician. [x]         Fall prevention education was provided and the patient/caregiver indicated understanding. [x]         Patient/family have participated as able in goal setting and plan of care. []         Patient/family agree to work toward stated goals and plan of care. []         Patient understands intent and goals of therapy, but is neutral about his/her participation. []         Patient is unable to participate in goal setting and plan of care.     Thank you for this referral.  Tasia Leyva, PT   Time Calculation: 41 mins

## 2018-09-19 NOTE — PROGRESS NOTES
Bedside and Verbal shift change report given to [facility] RN (oncoming nurse) by JUAN CARLOS Lopez (offgoing nurse). Report given with SBAR, Kardex, OR Summary, Intake/Output, MAR and Recent Results.

## 2018-09-19 NOTE — PROGRESS NOTES
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  Having bilateral hand pain, pain feels improved from yesterday, Pt feels like she has a little more ROM in R UE   No numbness  Denies nausea/vomiting, headache, chest pain or shortness of breath  Pain controlled    Recent Labs      18   0626   HGB  11.8   NA  142   K  4.3   CL  108   CO2  25   BUN  9   CREA  0.79   GLU  123*     Patient Vitals for the past 12 hrs:   BP Temp Pulse Resp SpO2   18 0720 153/80 98.2 °F (36.8 °C) (!) 59 14 99 %   18 0300 144/70 98.2 °F (36.8 °C) 66 16 98 %   18 2311 160/73 97.7 °F (36.5 °C) 63 16 98 %       Exam:  VISTA collar inplace / intact  Dressings clean and dry  Positive strength/Unchanged ROM bilat upper ext. Stable neuro exam  Neuro intact to sensation  BL UEs NVID  Cranial nerves grossly intact. Clear speech. LEs w/ nml strength and sensation. Bilat LEs are NVID    PLAN:  Continue PO pain medications as needed  Chloraseptic spray at bedside  Advance diet as tolerated  Out of bed w/ assist  PT/OT      Pili Castle, [address]  Orthopaedic Surgery  Physician Assistant to Dr. Carmenza Collier    11:26 AM  Spoke w/ NP Evonne Green. Pt seems to have more weakness compared to exam this AM during rounds.  Will get a STAT MRI of the cervical spine w/ and w/o contrast. Pt has been discussed with Dr. Lam Ashby pending    Pili Castle, PA

## 2018-09-20 ENCOUNTER — APPOINTMENT (OUTPATIENT)
Dept: MRI IMAGING | Age: 63
DRG: 473 | End: 2018-09-20
Attending: NURSE PRACTITIONER
Payer: COMMERCIAL

## 2018-09-20 LAB
ANION GAP SERPL CALC-SCNC: 8 MMOL/L (ref 5–15)
BUN SERPL-MCNC: 15 MG/DL (ref 6–20)
BUN/CREAT SERPL: 17 (ref 12–20)
CALCIUM SERPL-MCNC: 9.3 MG/DL (ref 8.5–10.1)
CHLORIDE SERPL-SCNC: 112 MMOL/L (ref 97–108)
CO2 SERPL-SCNC: 28 MMOL/L (ref 21–32)
CREAT SERPL-MCNC: 0.88 MG/DL (ref 0.55–1.02)
GLUCOSE SERPL-MCNC: 147 MG/DL (ref 65–100)
HGB BLD-MCNC: 12.1 G/DL (ref 11.5–16)
POTASSIUM SERPL-SCNC: 4 MMOL/L (ref 3.5–5.1)
SODIUM SERPL-SCNC: 148 MMOL/L (ref 136–145)

## 2018-09-20 PROCEDURE — 97116 GAIT TRAINING THERAPY: CPT

## 2018-09-20 PROCEDURE — 74011000250 HC RX REV CODE- 250: Performed by: ORTHOPAEDIC SURGERY

## 2018-09-20 PROCEDURE — A9575 INJ GADOTERATE MEGLUMI 0.1ML: HCPCS | Performed by: RADIOLOGY

## 2018-09-20 PROCEDURE — 99218 HC RM OBSERVATION: CPT

## 2018-09-20 PROCEDURE — 97530 THERAPEUTIC ACTIVITIES: CPT

## 2018-09-20 PROCEDURE — 74011250637 HC RX REV CODE- 250/637: Performed by: ORTHOPAEDIC SURGERY

## 2018-09-20 PROCEDURE — 97535 SELF CARE MNGMENT TRAINING: CPT

## 2018-09-20 PROCEDURE — 74011250636 HC RX REV CODE- 250/636: Performed by: RADIOLOGY

## 2018-09-20 PROCEDURE — 36415 COLL VENOUS BLD VENIPUNCTURE: CPT | Performed by: PHYSICIAN ASSISTANT

## 2018-09-20 PROCEDURE — 77010033678 HC OXYGEN DAILY

## 2018-09-20 PROCEDURE — G8988 SELF CARE GOAL STATUS: HCPCS

## 2018-09-20 PROCEDURE — 72156 MRI NECK SPINE W/O & W/DYE: CPT

## 2018-09-20 PROCEDURE — 80048 BASIC METABOLIC PNL TOTAL CA: CPT | Performed by: PHYSICIAN ASSISTANT

## 2018-09-20 PROCEDURE — 85018 HEMOGLOBIN: CPT | Performed by: ORTHOPAEDIC SURGERY

## 2018-09-20 PROCEDURE — 94760 N-INVAS EAR/PLS OXIMETRY 1: CPT

## 2018-09-20 PROCEDURE — 74011250637 HC RX REV CODE- 250/637: Performed by: PHYSICIAN ASSISTANT

## 2018-09-20 PROCEDURE — 97166 OT EVAL MOD COMPLEX 45 MIN: CPT

## 2018-09-20 PROCEDURE — G8987 SELF CARE CURRENT STATUS: HCPCS

## 2018-09-20 PROCEDURE — 74011250636 HC RX REV CODE- 250/636: Performed by: PHYSICIAN ASSISTANT

## 2018-09-20 RX ORDER — OXYCODONE HYDROCHLORIDE 5 MG/1
15 TABLET ORAL
Status: DISCONTINUED | OUTPATIENT
Start: 2018-09-20 | End: 2018-09-24 | Stop reason: HOSPADM

## 2018-09-20 RX ORDER — OXYCODONE HYDROCHLORIDE 5 MG/1
10 TABLET ORAL
Status: DISCONTINUED | OUTPATIENT
Start: 2018-09-20 | End: 2018-09-24 | Stop reason: HOSPADM

## 2018-09-20 RX ORDER — CALCIUM CARBONATE 200(500)MG
200 TABLET,CHEWABLE ORAL
Status: DISCONTINUED | OUTPATIENT
Start: 2018-09-20 | End: 2018-09-24 | Stop reason: HOSPADM

## 2018-09-20 RX ORDER — GADOTERATE MEGLUMINE 376.9 MG/ML
18 INJECTION INTRAVENOUS
Status: COMPLETED | OUTPATIENT
Start: 2018-09-20 | End: 2018-09-20

## 2018-09-20 RX ORDER — DEXAMETHASONE SODIUM PHOSPHATE 10 MG/ML
10 INJECTION INTRAMUSCULAR; INTRAVENOUS
Status: COMPLETED | OUTPATIENT
Start: 2018-09-20 | End: 2018-09-20

## 2018-09-20 RX ADMIN — PANTOPRAZOLE SODIUM 40 MG: 40 TABLET, DELAYED RELEASE ORAL at 07:36

## 2018-09-20 RX ADMIN — HYDROMORPHONE HYDROCHLORIDE 4 MG: 2 TABLET ORAL at 08:28

## 2018-09-20 RX ADMIN — Medication 1 TABLET: at 08:28

## 2018-09-20 RX ADMIN — OXYCODONE HYDROCHLORIDE 15 MG: 5 TABLET ORAL at 19:02

## 2018-09-20 RX ADMIN — PRAVASTATIN SODIUM 40 MG: 20 TABLET ORAL at 21:49

## 2018-09-20 RX ADMIN — HYDROMORPHONE HYDROCHLORIDE 4 MG: 2 TABLET ORAL at 15:12

## 2018-09-20 RX ADMIN — GADOTERATE MEGLUMINE 18 ML: 376.9 INJECTION INTRAVENOUS at 14:41

## 2018-09-20 RX ADMIN — ANTACID TABLETS 200 MG: 500 TABLET, CHEWABLE ORAL at 19:37

## 2018-09-20 RX ADMIN — Medication 10 ML: at 15:13

## 2018-09-20 RX ADMIN — GABAPENTIN 300 MG: 300 CAPSULE ORAL at 08:28

## 2018-09-20 RX ADMIN — Medication 10 ML: at 07:31

## 2018-09-20 RX ADMIN — Medication 10 ML: at 21:53

## 2018-09-20 RX ADMIN — OXYCODONE HYDROCHLORIDE 15 MG: 5 TABLET ORAL at 23:19

## 2018-09-20 RX ADMIN — GABAPENTIN 300 MG: 300 CAPSULE ORAL at 15:12

## 2018-09-20 RX ADMIN — GABAPENTIN 900 MG: 300 CAPSULE ORAL at 21:49

## 2018-09-20 RX ADMIN — BENAZEPRIL HYDROCHLORIDE 10 MG: 10 TABLET ORAL at 08:28

## 2018-09-20 RX ADMIN — Medication 1 TABLET: at 19:02

## 2018-09-20 RX ADMIN — POLYETHYLENE GLYCOL 3350 17 G: 17 POWDER, FOR SOLUTION ORAL at 08:28

## 2018-09-20 RX ADMIN — DEXAMETHASONE SODIUM PHOSPHATE 10 MG: 10 INJECTION, SOLUTION INTRAMUSCULAR; INTRAVENOUS at 21:53

## 2018-09-20 NOTE — PROGRESS NOTES
Patient seen this morning. Attempted again at 36, patient eating lunch, attempted again at this time, patient at MRI - off floor and unavailable. Will continue to attempt per plan of care.

## 2018-09-20 NOTE — PROGRESS NOTES
9/20/2018     11:52 AM  CM informed rehab is recommended. CM met with pt who was in agreement, and indicated preference as [facility]. CM completed referral and is awaiting response. 9:39 AM  Reason for Admission:   Elective - Acute cervical radiculopathy                   RRAT Score:          5           Plan for utilizing home health:      TBD                    Likelihood of Readmission:  Green                         Transition of Care Plan:                      CM met with pt for assessment. Demographics and PCP were confirmed. Pt is a 61year old,  female who lives in a private residence with her  (3 exterior steps, 0 interior steps). PTA, pt was able to complete ADLs with the use of a cane and grabber. Pt has prescription drug coverage, and prefers [facility] on [address]. OBS letter provided and explained. No discharge service needs indicated. Pt's  will provide transport upon discharge. Vidal Thompson MA    Care Management Interventions  PCP Verified by CM: Yes (Dr. Natacha Nguyen in [place], [place] - No NN to notify)  Palliative Care Criteria Met (RRAT>21 & CHF Dx)?: No  Mode of Transport at Discharge:  Other (see comment) ()  [place] Signup: No  Discharge Durable Medical Equipment: No  Physical Therapy Consult: Yes  Occupational Therapy Consult: Yes  Speech Therapy Consult: No  Current Support Network: Lives with Spouse  Confirm Follow Up Transport: Family  Plan discussed with Pt/Family/Caregiver: Yes  Discharge Location  Discharge Placement: Unable to determine at this time

## 2018-09-20 NOTE — PROGRESS NOTES
ORTHOPAEDIC SPINE SURGERY PROGRESS NOTE    Re-evaluated patient after new post-operative MRI. MRI was reviewed with Dr. Svetlana Ludwig. Pt is aware that MRI shows some neuroforaminal narrowing at C4-C5 and C5-C6. This is likely due to surgical fluid/surgifoam. Pt reports that her symptoms are unchanged. Neuro exam is stable compared to prior rounding exams. No hyperreflexia.  strength is equal bilaterally. Cranial nerves grossly intact. Clear speech. LEs w/ nml motor and sensory exams. All extremities are NVID  Pt denies any cp, sob, dizziness, palpitations, vision changes, speech changes, bowel/bladder dysfunction, numbness. Pt reports that dilaudid is not controlling her pain.  Wants medication changed back to oxycodone, medication changed with dosage of 10-15 mg every 4 hours prn post-operative pain as d/w patient     Will continue to closely monitor patient    Joyce Pérez [place]  Orthopaedic Spine Surgery  Physician Assistant to Dr. Stephany Hines

## 2018-09-20 NOTE — PROGRESS NOTES
Bedside and Verbal shift change report given to [facility] (oncoming nurse) by JUAN CARLOS Weaver (offgoing nurse). Report given with SBAR, Kardex, OR Summary, Intake/Output, MAR and Recent Results.

## 2018-09-20 NOTE — PROGRESS NOTES
Problem: Self Care Deficits Care Plan (Adult)  Goal: *Acute Goals and Plan of Care (Insert Text)  Occupational Therapy Goals  Initiated 9/20/2018  1. Patient will perform self-feeding with min A with LUE using built up  and distal support PRN within 7 days. 2.  Patient will perform UB body dressing with minimal assistance using most appropriate DME within 7 days. 3.  Patient will toileting tasks at minimal assistance/contact guard assist within 7 days. 4.  Patient will complete basic grooming with LUE in sitting at minimal assistance/contact guard assist within 7 days. 5.  Patient will verbalize/demonstrate 3/3 back precautions during ADL tasks without cues within 7 days. Occupational Therapy EVALUATION  Patient: Brandon Chowdhury (64 y.o. female)  Date: 9/20/2018  Primary Diagnosis: Acute cervical radiculopathy [M54.12]  Cervical spinal stenosis [M48.02]  Procedure(s) (LRB):  C4-C7 ANTERIOR CERVICAL DISCECTOMY AND FUSION WITH INSTRUMENTATION (N/A) 2 Days Post-Op   Precautions:cervical precautions, fall       ASSESSMENT :  Based on the objective data described below, the patient presents with severe impairments in BUE PROM/AROM, strength, and sensation s/p C4-C7 ACDF POD 2. Nursing cleared for therapy. Received in chair, pleasant and agreeable to therapy. She lives at home with  and prior to therapy was indep with ADL tasks with cane. She had pain and tingling in L hand but denies any difficulties with RUE. At this time RUE non functional with trace finger flexion/extension, wrist extension, bicep/tricep and 3/5 shoulder elevation. All of RUE hyperalgesia. Unable to test PROM secondary to severe hyperalgesia. LUE with overall 2-3/5 AROM with impaired strength. She was able to hold cup once placed in hand needing [employer] to bring to mouth and able to hold toilet paper with additional time. Amb to bathroom with CGA with cane with slow pace, unable to manage any doors or faucet for water. Provided ed on desensitization for light touch for RUE as tolerated and able, yellow foam for  strength and use of built up utensils. Left up in chair. Recommend dc to inpatient rehab as patient is presenting below her baseline needing max -total A for ADL tasks. Discussed with ortho NP and case mgmr. Patient will benefit from skilled intervention to address the above impairments. Patients rehabilitation potential is considered to be Fair  Factors which may influence rehabilitation potential include:   []             None noted  []             Mental ability/status  [x]             Medical condition  []             Home/family situation and support systems  []             Safety awareness  []             Pain tolerance/management  []             Other:      PLAN :  Recommendations and Planned Interventions:  [x]               Self Care Training                  [x]        Therapeutic Activities  [x]               Functional Mobility Training    []        Cognitive Retraining  [x]               Therapeutic Exercises           [x]        Endurance Activities  [x]               Balance Training                   []        Neuromuscular Re-Education  []               Visual/Perceptual Training     [x]   Home Safety Training  [x]               Patient Education                 [x]        Family Training/Education  []               Other (comment):    Frequency/Duration: Patient will be followed by occupational therapy 5 times a week to address goals. Discharge Recommendations: Inpatient Rehab  Further Equipment Recommendations for Discharge: TBD rehab     SUBJECTIVE:   Patient stated Berlin Romano had trouble with my left hand but my right hand would help.     OBJECTIVE DATA SUMMARY:   HISTORY:   Past Medical History:   Diagnosis Date    Arthritis     GERD (gastroesophageal reflux disease)     H/O degenerative disc disease     Hyperlipemia     Hypertension     Nausea & vomiting     Obesity (BMI 30-39. 9) 09/10/2018    BMI= 36    Sleep apnea     CPAP- has not used since weight loss    Vertigo      Past Surgical History:   Procedure Laterality Date    ABDOMEN SURGERY PROC UNLISTED  1989    Abdominal wall surgery after childbirth    HX COLONOSCOPY      HX HEART CATHETERIZATION  2014    Patient states no blockages found    HX HYSTERECTOMY  1999    HX ORTHOPAEDIC Left 2014    hip replacement       Prior Level of Function/Environment/Context: Home with . Indep ADL tasks with cane  Occupations in which the patient is/was successful, what are the barriers preventing that success:   Performance Patterns (routines, roles, habits, and rituals):   Personal Interests and/or values:   Expanded or extensive additional review of patient history:     Home Situation  Home Environment: Private residence  # Steps to Enter: 3  One/Two Story Residence: One story  Living Alone: No  Support Systems: Spouse/Significant Other/Partner  Patient Expects to be Discharged to[de-identified] Private residence  Current DME Used/Available at Home: None  Tub or Shower Type: Tub/Shower combination    Hand dominance: Left    EXAMINATION OF PERFORMANCE DEFICITS:  Cognitive/Behavioral Status:  Neurologic State: Alert; Appropriate for age  Orientation Level: Oriented X4  Cognition: Appropriate decision making; Appropriate for age attention/concentration; Appropriate safety awareness         Hearing:   Auditory  Auditory Impairment: None    Vision/Perceptual:                           Acuity: Within Defined Limits    Corrective Lenses: Glasses    Range of Motion:  AROM: Grossly decreased, non-functional  PROM: Grossly decreased, non-functional                      Strength:  Strength: Grossly decreased, non-functional                Coordination:  Coordination: Grossly decreased, non-functional  Fine Motor Skills-Upper: Left Impaired;Right Impaired    Gross Motor Skills-Upper: Left Impaired;Right Impaired    Tone & Sensation:  Sensation: Impaired Balance:  Sitting: Intact  Standing: Impaired  Standing - Static: Fair  Standing - Dynamic : Fair    Functional Mobility and Transfers for ADLs:  Bed Mobility:  Rolling:  (received up and left up)  Supine to Sit: Moderate assistance;Assist x1;Additional time  Sit to Supine: Moderate assistance; Additional time;Assist x1  Scooting: Contact guard assistance    Transfers:  Sit to Stand: Minimum assistance  Stand to Sit: Contact guard assistance  Toilet Transfer : Minimum assistance (cane level)    ADL Assessment:  Feeding: Moderate assistance;Maximum assistance  Oral Facial Hygiene/Grooming: Moderate assistance;Maximum assistance  Bathing: Total assistance  Upper Body Dressing: Maximum assistance  Lower Body Dressing: Total assistance  Toileting: Total assistance          ADL Intervention and task modifications:  Feeding  Drink to Mouth: Maximum assistance (able to complete one trial with light cup, then fatigued) ed on support at elbow for additional cantu    Grooming  Washing Hands: Minimum assistance; Compensatory technique training;Proximal stability  Brushing/Combing Hair: Total assistance (dependent)    Lower Body Dressing Assistance  Socks: Total assistance (dependent)    Toileting  Bladder Hygiene: Moderate assistance (balance and clothing mgmt)  Clothing Management: Maximum assistance      Functional Measure:  Barthel Index:    Bathin  Bladder: 10  Bowels: 10  Groomin  Dressin  Feedin  Mobility: 0  Stairs: 0  Toilet Use: 0  Transfer (Bed to Chair and Back): 10  Total: 30       Barthel and G-code impairment scale:  Percentage of impairment CH  0% CI  1-19% CJ  20-39% CK  40-59% CL  60-79% CM  80-99% CN  100%   Barthel Score 0-100 100 99-80 79-60 59-40 20-39 1-19   0   Barthel Score 0-20 20 17-19 13-16 9-12 5-8 1-4 0      The Barthel ADL Index: Guidelines  1. The index should be used as a record of what a patient does, not as a record of what a patient could do.   2. The main aim is to establish degree of independence from any help, physical or verbal, however minor and for whatever reason. 3. The need for supervision renders the patient not independent. 4. A patient's performance should be established using the best available evidence. Asking the patient, friends/relatives and nurses are the usual sources, but direct observation and common sense are also important. However direct testing is not needed. 5. Usually the patient's performance over the preceding 24-48 hours is important, but occasionally longer periods will be relevant. 6. Middle categories imply that the patient supplies over 50 per cent of the effort. 7. Use of aids to be independent is allowed. Jese Singh., Barthel, DRockW. (4555). Functional evaluation: the Barthel Index. [address] [facility] (14)2. Erika Shirley ronnie GEOVANNY Fine, Nicolas Siegel., Korin Murphy., [place], [facility] (1999). Measuring the change indisability after inpatient rehabilitation; comparison of the responsiveness of the Barthel Index and Functional [place] Measure. Journal of Neurology, Neurosurgery, and Psychiatry, 66(4), 990-137. Matthias Keith, N.J.A, YURI Reis, & Halle Elliott, MRockA. (2004.) Assessment of post-stroke quality of life in cost-effectiveness studies: The usefulness of the Barthel Index and the EuroQoL-5D. Quality of Life Research, 13, 216-67         G codes: In compliance with CMSs Claims Based Outcome Reporting, the following G-code set was chosen for this patient based on their primary functional limitation being treated: The outcome measure chosen to determine the severity of the functional limitation was the bartehl index with a score of 30/100 which was correlated with the impairment scale. ?  Self Care:     - CURRENT STATUS: CL - 60%-79% impaired, limited or restricted    - GOAL STATUS: CK - 40%-59% impaired, limited or restricted    - D/C STATUS:  ---------------To be determined---------------     Occupational Therapy Evaluation Charge Determination   History Examination Decision-Making   LOW Complexity : Brief history review  HIGH Complexity : 5 or more performance deficits relating to physical, cognitive , or psychosocial skils that result in activity limitations and / or participation restrictions HIGH Complexity : Patient presents with comorbidities that affect occupational performance. Signifigant modification of tasks or assistance (eg, physical or verbal) with assessment (s) is necessary to enable patient to complete evaluation       Based on the above components, the patient evaluation is determined to be of the following complexity level: HIGH   Pain:  Pain Scale 1: Numeric (0 - 10)  Pain Intensity 1: 5  Pain Location 1: Hand  Pain Orientation 1: Left;Right     Pain Intervention(s) 1: Medication (see MAR)    Activity Tolerance:   Limited secondary to pain. Denies dizziness. After treatment:   [x] Patient left in no apparent distress sitting up in chair  [] Patient left in no apparent distress in bed  [x] Call bell left within reach  [] Nursing notified  [x] Caregiver present  [] Bed alarm activated    COMMUNICATION/EDUCATION:   The patients plan of care was discussed with: Physical Therapist, Registered Nurse and Patient. [x] Home safety education was provided and the patient/caregiver indicated understanding. [] Patient/family have participated as able in goal setting and plan of care. [x] Patient/family agree to work toward stated goals and plan of care. [] Patient understands intent and goals of therapy, but is neutral about his/her participation. [] Patient is unable to participate in goal setting and plan of care. This patients plan of care is appropriate for delegation to [facility].     Thank you for this referral.  Vandana Crowell OT  Time Calculation: 28 mins

## 2018-09-20 NOTE — PROGRESS NOTES
Received bedside ,Verbal shift change report from [facility], off going nurse report included SBAR,MAR,Kardex  And procedure results. Pt alert and vital signs stable at report.

## 2018-09-20 NOTE — PROGRESS NOTES
Problem: Mobility Impaired (Adult and Pediatric)  Goal: *Acute Goals and Plan of Care (Insert Text)  Physical Therapy Goals  Initiated 9/19/2018  1. Patient will move from supine to sit and sit to supine  in bed with minimal assistance/contact guard assist within 7 day(s). 2.  Patient will transfer from bed to chair and chair to bed with minimal assistance/contact guard assist using the least restrictive device within 7 day(s). 3.  Patient will perform sit to stand with minimal assistance/contact guard assist within 7 day(s). 4.  Patient will ambulate with minimal assistance/contact guard assist for 100 feet with the least restrictive device within 7 day(s). 5.  Patient will ascend/descend 4 stairs with 1 handrail(s) with minimal assistance/contact guard assist within 7 day(s). physical Therapy TREATMENT  Patient: Luan Rossi (64 y.o. female)  Date: 9/20/2018  Diagnosis: Acute cervical radiculopathy [M54.12]  Cervical spinal stenosis [M48.02] <principal problem not specified>  Procedure(s) (LRB):  C4-C7 ANTERIOR CERVICAL DISCECTOMY AND FUSION WITH INSTRUMENTATION (N/A) 2 Days Post-Op  Precautions:    Chart, physical therapy assessment, plan of care and goals were reviewed. ASSESSMENT:  Patient improved from yesterday but still requires much assistance for safe mobility. Patient with limited use of her arms. Right arm is weaker than her left. Limited by weakness and by pain with AROM. Patient unable to hold a cup to drink in either hand, but able to grasp a cane when hand is in low position. Reviewed in/out of bed using supine<>sidelying<>sit with min to moderate assist and much cueing for technique. Performed transfer training for sit <> stand from bed and from chair with armrests. The patient requires assistance for cane management before and after transfer and she requires cues for best technique. Performed gait training in the nunez with the cane and min assist of one.   Kim with forward flexed posture and altered center of gravity. Patient with one loss of balance with min assist to recover. Also performed stair training with one rail, patient unable to use right hand on the stair rail (due to weakness and pain), requires min assist for balance and one standing rest break and assist with cane management. Patient is still very limited at this point, requires assist with all transfers and mobility. Her  would require training for safe transfer assists. He also cares for his father who has cancer. Spoke with OT and , recommend pursuing rehab at discharge to continue with transfer and gait training to improve safety and independence. Recommended soft touch call button to nurse. Progression toward goals:  [x]    Improving appropriately and progressing toward goals  []    Improving slowly and progressing toward goals  []    Not making progress toward goals and plan of care will be adjusted     PLAN:  Patient continues to benefit from skilled intervention to address the above impairments. Continue treatment per established plan of care. Discharge Recommendations:  Inpatient Rehab  Further Equipment Recommendations for Discharge:  tbd     SUBJECTIVE:   Patient stated I hurts even to move my [right] arm.     OBJECTIVE DATA SUMMARY:   Critical Behavior:  Neurologic State: Alert  Orientation Level: Oriented X4  Cognition: Appropriate decision making, Appropriate for age attention/concentration, Appropriate safety awareness     Functional Mobility Training:  Bed Mobility:  Rolling: Contact guard assistance  Supine to Sit: Moderate assistance;Assist x1;Additional time  Sit to Supine: Moderate assistance; Additional time;Assist x1  Scooting: Contact guard assistance        Transfers:  Sit to Stand:  Moderate assistance;Assist x1;Additional time  Stand to Sit: Moderate assistance;Assist x1;Additional time                             Balance:     Ambulation/Gait Training:  Distance (ft): 100 Feet (ft)  Assistive Device: Cane, straight;Brace/Splint;Gait belt  Ambulation - Level of Assistance: Minimal assistance;Assist x1;Additional time        Gait Abnormalities: Path deviations        Base of Support: Widened;Center of gravity altered     Speed/Lauryn: Slow  Step Length: Right shortened;Left shortened                    Stairs:  Number of Stairs Trained: 4  Stairs - Level of Assistance: Moderate assistance;Assist X1;Additional time   Rail Use: Left  (left going up, right going down)    Pain:  Pain Scale 1: Numeric (0 - 10)  Pain Intensity 1: 5  Pain Location 1: Hand  Pain Orientation 1: Left;Right     Pain Intervention(s) 1: Medication (see MAR)  Activity Tolerance:   Limited by pain and weakness  Please refer to the flowsheet for vital signs taken during this treatment.   After treatment:   [x]    Patient left in no apparent distress sitting up in chair  []    Patient left in no apparent distress in bed  [x]    Call bell left within reach  [x]    Nursing notified  []    Caregiver present  []    Bed alarm activated    COMMUNICATION/COLLABORATION:   The patients plan of care was discussed with: Occupational Therapist, Registered Nurse and     Cira Silva PT   Time Calculation: 24 mins

## 2018-09-20 NOTE — PROGRESS NOTES
Spiritual Care Assessment/Progress Note  [address]      NAME: Antonia Gee      MRN: [id number]  AGE: 61 y.o.  SEX: female  Episcopalian Affiliation: Nondenominational   Language: English     9/20/2018     Total Time (in minutes): 10     Spiritual Assessment begun in [facility] POST SURG ORT 1 through conversation with:         [x]Patient        [] Family    [] Friend(s)        Reason for Consult: Request by staff     Spiritual beliefs: (Please include comment if needed)     [x] Identifies with a gali tradition:    Roby Grant     [] Supported by a gali community:            [] Claims no spiritual orientation:           [] Seeking spiritual identity:                [] Adheres to an individual form of spirituality:           [] Not able to assess:                           Identified resources for coping:      [] Prayer                               [] Music                  [] Guided Imagery     [] Family/friends                 [] Pet visits     [x] Devotional reading                         [] Unknown     [] Other:                                           Interventions offered during this visit: (See comments for more details)    Patient Interventions: Affirmation of emotions/emotional suffering, Affirmation of gali, Iconic (affirming the presence of God/Higher Power), Initial/Spiritual assessment, patient floor, Prayer (assurance of)           Plan of Care:     [] Support spiritual and/or cultural needs    [] Support AMD and/or advance care planning process      [] Support grieving process   [] Coordinate Rites and/or Rituals    [] Coordination with community clergy   [] No spiritual needs identified at this time   [] Detailed Plan of Care below (See Comments)  [] Make referral to Music Therapy  [] Make referral to Pet Therapy     [] Make referral to Addiction services  [] Make referral to [facility]  [] Make referral to Spiritual Care Partner  [] No future visits requested        [x] Follow up visits as needed     Comments: Follow up from spiritual Care Partner volunteer visit. Miss Anahi Whaley was thankful for the visit and the Bibles and Daily Breads. She has a strong Worship Belief. She is hopeful to be going to a rehab to get her strength back. He  has a couple of other family members at home he is caring for and she does not want to add to the burden.   Provided assurance of prayer as I received a call to elsewhere in the hospital.  Visited by: Fady Dolores [id number] [facility] Nayeli Schumacher ([id number])

## 2018-09-21 PROCEDURE — 97530 THERAPEUTIC ACTIVITIES: CPT

## 2018-09-21 PROCEDURE — 74011250637 HC RX REV CODE- 250/637: Performed by: ORTHOPAEDIC SURGERY

## 2018-09-21 PROCEDURE — 65270000029 HC RM PRIVATE

## 2018-09-21 PROCEDURE — 99218 HC RM OBSERVATION: CPT

## 2018-09-21 PROCEDURE — 97535 SELF CARE MNGMENT TRAINING: CPT

## 2018-09-21 PROCEDURE — 74011000250 HC RX REV CODE- 250: Performed by: ORTHOPAEDIC SURGERY

## 2018-09-21 PROCEDURE — 74011250637 HC RX REV CODE- 250/637: Performed by: PHYSICIAN ASSISTANT

## 2018-09-21 PROCEDURE — 97110 THERAPEUTIC EXERCISES: CPT

## 2018-09-21 PROCEDURE — 97116 GAIT TRAINING THERAPY: CPT

## 2018-09-21 RX ADMIN — ACETAMINOPHEN 650 MG: 325 TABLET ORAL at 11:18

## 2018-09-21 RX ADMIN — ANTACID TABLETS 200 MG: 500 TABLET, CHEWABLE ORAL at 19:16

## 2018-09-21 RX ADMIN — Medication 1 TABLET: at 18:03

## 2018-09-21 RX ADMIN — OXYCODONE HYDROCHLORIDE 10 MG: 5 TABLET ORAL at 22:37

## 2018-09-21 RX ADMIN — CYCLOBENZAPRINE HYDROCHLORIDE 10 MG: 10 TABLET, FILM COATED ORAL at 08:46

## 2018-09-21 RX ADMIN — GABAPENTIN 300 MG: 300 CAPSULE ORAL at 15:17

## 2018-09-21 RX ADMIN — GABAPENTIN 300 MG: 300 CAPSULE ORAL at 08:45

## 2018-09-21 RX ADMIN — PRAVASTATIN SODIUM 40 MG: 20 TABLET ORAL at 22:36

## 2018-09-21 RX ADMIN — Medication 1 TABLET: at 08:45

## 2018-09-21 RX ADMIN — POLYETHYLENE GLYCOL 3350 17 G: 17 POWDER, FOR SOLUTION ORAL at 08:45

## 2018-09-21 RX ADMIN — Medication 10 ML: at 06:54

## 2018-09-21 RX ADMIN — Medication 10 ML: at 15:17

## 2018-09-21 RX ADMIN — OXYCODONE HYDROCHLORIDE 10 MG: 5 TABLET ORAL at 08:45

## 2018-09-21 RX ADMIN — ANTACID TABLETS 200 MG: 500 TABLET, CHEWABLE ORAL at 08:51

## 2018-09-21 RX ADMIN — Medication 10 ML: at 22:40

## 2018-09-21 RX ADMIN — OXYCODONE HYDROCHLORIDE 15 MG: 5 TABLET ORAL at 15:17

## 2018-09-21 RX ADMIN — GABAPENTIN 900 MG: 300 CAPSULE ORAL at 22:36

## 2018-09-21 RX ADMIN — PANTOPRAZOLE SODIUM 40 MG: 40 TABLET, DELAYED RELEASE ORAL at 07:30

## 2018-09-21 RX ADMIN — BENAZEPRIL HYDROCHLORIDE 10 MG: 10 TABLET ORAL at 08:46

## 2018-09-21 NOTE — PROGRESS NOTES
Problem: Self Care Deficits Care Plan (Adult)  Goal: *Acute Goals and Plan of Care (Insert Text)  Occupational Therapy Goals  Initiated 9/20/2018  1. Patient will perform self-feeding with min A with LUE using built up  and distal support PRN within 7 days. 2.  Patient will perform UB body dressing with minimal assistance using most appropriate DME within 7 days. 3.  Patient will toileting tasks at minimal assistance/contact guard assist within 7 days. 4.  Patient will complete basic grooming with LUE in sitting at minimal assistance/contact guard assist within 7 days. 5.  Patient will verbalize/demonstrate 3/3 back precautions during ADL tasks without cues within 7 days. Occupational Therapy TREATMENT  Patient: Ramone Henry (64 y.o. female)  Date: 9/21/2018  Diagnosis: Acute cervical radiculopathy [M54.12]  Cervical spinal stenosis [M48.02] <principal problem not specified>  Procedure(s) (LRB):  C4-C7 ANTERIOR CERVICAL DISCECTOMY AND FUSION WITH INSTRUMENTATION (N/A) 3 Days Post-Op  Precautions:    Chart, occupational therapy assessment, plan of care, and goals were reviewed. ASSESSMENT:  Pt seated in chair agreeable to OT. She was working on squeezing her yellow sponge both hands, R weaker than L. Pt is L hand dominant. After ambulation to the bathroom, with use of cane and one loss of balance to R with self correction, she transferred to bedside commode over toilet with min assist. She than verbalized chest pain but thinking it was from collar pressure or gas. Collar loose fitting, contacted RN. Vitals as follows: /86, O2 sats 98%, heart rate 68. Pt verbalizing some lightheadedness but stated she \"gets that way after taking Flexoril. \" RN stated pt could return to chair, and after elevation of both UE's with pillows educated pt as to working on finger to thumb opposition as able. Recommend rehab.    Progression toward goals:  [x]       Improving appropriately and progressing toward goals  []       Improving slowly and progressing toward goals  []       Not making progress toward goals and plan of care will be adjusted     PLAN:  Patient continues to benefit from skilled intervention to address the above impairments. Continue treatment per established plan of care. Discharge Recommendations:  Rehab  Further Equipment Recommendations for Discharge: None     SUBJECTIVE:   Patient stated I am left handed    OBJECTIVE DATA SUMMARY:   Cognitive/Behavioral Status:  Neurologic State: Alert  Orientation Level: Oriented X4  Cognition: Appropriate decision making             Functional Mobility and Transfers for ADLs:  Bed Mobility:       Transfers:  Sit to Stand: Contact guard assistance          Balance:  Sitting: Intact  Sitting - Static: Good (unsupported)  Sitting - Dynamic: Fair (occasional)  Standing: Impaired  Standing - Static: Constant support;Good  Standing - Dynamic : Fair    ADL Intervention:          Toileting  Bladder Hygiene: Minimum assistance; Moderate assistance  Clothing Management: Moderate assistance     Therapeutic Exercises:   Pt using yellow sponge both hands to increase grasp, R hand weaker than left. Educated pt as to performing finger to thumb opposition as able to increase coordination/dexterity for functional tasks. Pain:  Pain Scale 1: Numeric (0 - 10)  Pain Intensity 1: 7  Pain Location 1: Shoulder;Arm;Hand  Pain Orientation 1: Right  Pain Description 1: Sharp; Aching;Heaviness; Hypersensitivity  Pain Intervention(s) 1: Medication (see MAR)  Activity Tolerance:   Fair  Please refer to the flowsheet for vital signs taken during this treatment.   After treatment:   [x] Patient left in no apparent distress sitting up in chair  [] Patient left in no apparent distress in bed  [x] Call bell left within reach  [x] Nursing notified  [] Caregiver present  [] Bed alarm activated    COMMUNICATION/COLLABORATION:   The patients plan of care was discussed with: Physical Therapist, Occupational Therapist and Registered Nurse    REBECCA Espitia  Time Calculation: 25 mins

## 2018-09-21 NOTE — PHYSICIAN ADVISORY
Letter of Status Determination:   Recommend hospitalization status upgraded from   OBSERVATION  to INPATIENT  Status     Pt Name:  Antonia Gee   MR#   [address] # [id number] /  [id number]  Payor: Yulissa Abbasi / Plan: [address] HMO / Product Type: HMO /    CSN#  [id number]   [address]  403/01  @ [facility] [place]   Hospitalization date  9/18/2018 11:42 AM   Current Attending Physician  Roger Engel MD   Principal diagnosis  <principal problem not specified>   Acute cervical radiculopathy    Clinicals  61 y.o. y.o  female hospitalized with above diagnosis   This pt went through   [facility] to C7 anterior cervical diskectomy and fusion with instrumentation and application of interbody spacer at C4-C5, C5-C6 and C6-C7. \"     Her post operative recovery had been slow and MRI done on 9/20/18  shows increased signal intensity of the central cord. Plan for extended continued are in hospital is noted. [place] (MCG) criteria   Does   apply   Cervical Fusion, Anterior  ORG: S-320 (ISC)   STATUS DETERMINATION  Based on documented presenting clinical data, comorbid conditions, high risk of adverse events and deterioration, it is our recommendation that the patient's status should be upgraded from OBSERVATION to INPATIENT status. The final decision of the patient's hospitalization status depends on the attending physician's judgment. Additional comments     Payor: Yulissa Abbasi / Plan: [address] / Product Type: HMO /         Nathan Castro MD MPH FACP   Cell: [phone]  Physician [address]   [facility] [employer], [address] [place] [address]    Cell  [phone]        [phone]    .

## 2018-09-21 NOTE — PROGRESS NOTES
Bedside, Verbal and Written shift change report given to Curry Mota  (oncoming nurse) by Rashel Segundo RN  (offgoing nurse). Report included the following information SBAR, Kardex, OR Summary, Procedure Summary, Intake/Output, Accordion, Recent Results, Med Rec Status and Quality Measures.

## 2018-09-21 NOTE — PROGRESS NOTES
ORTHOPAEDIC CERVICAL FUSION PROGRESS NOTE    NAME:     Brandon Chowdhury   :       1955   MRN:       [id number]   DATE:      2018    POD:              3 Days Post-Op  S/P:              Procedure(s):  C4-C7 ANTERIOR CERVICAL DISCECTOMY AND FUSION WITH INSTRUMENTATION    SUBJECTIVE:  Pt reports that she is feeling improved compared to yesterday. Having some bilateral hand pain. Pt feels like she has a little more ROM in R UE   No numbness  Denies nausea/vomiting, headache, chest pain or shortness of breath  Pain controlled    Recent Labs      18   0639   HGB  12.1   NA  148*   K  4.0   CL  112*   CO2  28   BUN  15   CREA  0.88   GLU  147*     Patient Vitals for the past 12 hrs:   BP Temp Pulse Resp SpO2   18 0453 132/88 98.2 °F (36.8 °C) 68 15 94 %   18 2319 150/78 98.6 °F (37 °C) 68 16 94 %     Exam:   VISTA collar inplace / intact  Dressings clean and dry  Strength/ROM of bilat upper ext slightly improved. Neuro exam improved  Neuro intact to sensation  BL UEs NVID  Cranial nerves grossly intact. Clear speech. LEs w/ nml strength and sensation. Bilat LEs are NVID     PLAN:  Labs pending  Continue PO pain medications as needed  Chloraseptic spray at bedside  Tolerating diet  Out of bed w/ assist  PT/OT    She does have some neuroforaminal narrowing on her MRI at C4-C5 and C5-C6. This is likely due to surgical fluid/surgifoam that will resolve/resorb. Her weakness is likely due to cord signal changes. We will keep her admitted to [address] [place] [address] over the weekend to continue to monitor her. She does not want to consider any further surgery at this time.        Rip Benoit [place]  Orthopaedic Surgery  Physician Assistant to Dr. Ravi Hoover

## 2018-09-21 NOTE — PROGRESS NOTES
Problem: Falls - Risk of  Goal: *Absence of Falls  Document Norm Fall Risk and appropriate interventions in the flowsheet.    Outcome: Progressing Towards Goal  Fall Risk Interventions:  Mobility Interventions: Bed/chair exit alarm, Patient to call before getting OOB, PT Consult for mobility concerns, PT Consult for assist device competence, Utilize walker, cane, or other assistive device, Utilize gait belt for transfers/ambulation         Medication Interventions: Bed/chair exit alarm, Patient to call before getting OOB, Teach patient to arise slowly, Utilize gait belt for transfers/ambulation    Elimination Interventions: Bed/chair exit alarm, Patient to call for help with toileting needs, Toileting schedule/hourly rounds

## 2018-09-21 NOTE — PROGRESS NOTES
9/21/2018  9:54 AM  CM informed pt was accepted by [facility], and they have received auth. Attending is holding pt until Monday.

## 2018-09-21 NOTE — PROGRESS NOTES
Problem: Mobility Impaired (Adult and Pediatric)  Goal: *Acute Goals and Plan of Care (Insert Text)  Physical Therapy Goals  Initiated 9/19/2018  1. Patient will move from supine to sit and sit to supine  in bed with minimal assistance/contact guard assist within 7 day(s). 2.  Patient will transfer from bed to chair and chair to bed with minimal assistance/contact guard assist using the least restrictive device within 7 day(s). 3.  Patient will perform sit to stand with minimal assistance/contact guard assist within 7 day(s). 4.  Patient will ambulate with minimal assistance/contact guard assist for 100 feet with the least restrictive device within 7 day(s). 5.  Patient will ascend/descend 4 stairs with 1 handrail(s) with minimal assistance/contact guard assist within 7 day(s). physical Therapy TREATMENT  Patient: Saida Antoine (64 y.o. female)  Date: 9/21/2018  Diagnosis: Acute cervical radiculopathy [M54.12]  Cervical spinal stenosis [M48.02] <principal problem not specified>  Procedure(s) (LRB):  C4-C7 ANTERIOR CERVICAL DISCECTOMY AND FUSION WITH INSTRUMENTATION (N/A) 3 Days Post-Op  Precautions:  Cervical, C-collar, Fall  Chart, physical therapy assessment, plan of care and goals were reviewed. ASSESSMENT:  Chart reviewed, per RN, pt appropriate for PT session at this time. Pt greeted sitting up in chair at bedside, agreeable to participate in therapy treatment. Overall, pt with improved gross motor independence and balance as compared to previous visit with physical therapy. She continues to require Min A to [address] for all transfers, ambulation and stair negotiation but demonstrated improved mobility tolerance and safety. Today, she ambualted 165ft with single point cant with CGA to light min A and ascended/descended 4 steps with Min A using step to pattern. Continue to fell that pt will benefit most from continued therapy in a rehab setting once medically stable.  Acute PT will continue to follow pt while she remains in the acute setting to address deficits and improve function, safety and independence. Progression toward goals:  [x]    Improving appropriately and progressing toward goals  []    Improving slowly and progressing toward goals  []    Not making progress toward goals and plan of care will be adjusted     PLAN:  Patient continues to benefit from skilled intervention to address the above impairments. Continue treatment per established plan of care. Discharge Recommendations:  Rehab  Further Equipment Recommendations for Discharge:  None. SUBJECTIVE:   Patient stated I think I'm doing ok.     OBJECTIVE DATA SUMMARY:   Critical Behavior:  Neurologic State: Alert  Orientation Level: Oriented X4  Cognition: Appropriate decision making, Appropriate for age attention/concentration, Appropriate safety awareness, Follows commands     Functional Mobility Training:  Bed Mobility:   Not assessed this visit. Pt greeted sitting up in bed. Transfers:  Sit to Stand: Contact guard assistance  Stand to Sit: Contact guard assistance                             Balance:  Sitting: Intact  Sitting - Static: Good (unsupported)  Sitting - Dynamic: Fair (occasional)  Standing: Impaired  Standing - Static: Constant support;Good  Standing - Dynamic : Fair  Ambulation/Gait Training:  Distance (ft): 165 Feet (ft)  Assistive Device: Cane, straight;Gait belt  Ambulation - Level of Assistance: Contact guard assistance                 Base of Support: Widened     Speed/Lauryn: Shuffled;Fluctuations                      Stairs:  Number of Stairs Trained: 4  Stairs - Level of Assistance: Minimum assistance   Rail Use: Left     Pain:  Pain Scale 1: Numeric (0 - 10)  Pain Intensity 1: 7  Pain Location 1: Shoulder;Arm;Hand  Pain Orientation 1: Right  Pain Description 1: Sharp; Aching;Heaviness; Hypersensitivity  Pain Intervention(s) 1: Medication (see MAR)  Activity Tolerance:    Tolerated session well, limited by pain. Vitals stable throughout    Please refer to the flowsheet for vital signs taken during this treatment.   After treatment:   [x]    Patient left in no apparent distress sitting up in chair  []    Patient left in no apparent distress in bed  [x]    Call bell left within reach  []    Nursing notified  []    Caregiver present  []    Bed alarm activated    COMMUNICATION/COLLABORATION:   The patients plan of care was discussed with: Occupational Therapist and Registered Nurse    Wellington Sommers PT, DPT   Time Calculation: 25 mins

## 2018-09-21 NOTE — PROGRESS NOTES
Physical Therapy Note    Chart reviewed. Attempted to see pt for PM PT session. Pt greeted lying supine in bed asleep. She woke with verbal and light tactile stim but mumbled only and verbalized \"No\" in response to encouragement to work with therapy. Will reattempt later in day if able. Thank you.     Roberto Carlos Villanueva PT, DPT

## 2018-09-21 NOTE — PROGRESS NOTES
Bedside and Verbal shift change report given to [address] [place] [address] S (oncoming nurse) by Dante Dowling RN (offgoing nurse). Report included the following information SBAR, Kardex, MAR and Recent Results.

## 2018-09-22 LAB
ANION GAP SERPL CALC-SCNC: 7 MMOL/L (ref 5–15)
BUN SERPL-MCNC: 14 MG/DL (ref 6–20)
BUN/CREAT SERPL: 18 (ref 12–20)
CALCIUM SERPL-MCNC: 8.5 MG/DL (ref 8.5–10.1)
CHLORIDE SERPL-SCNC: 107 MMOL/L (ref 97–108)
CO2 SERPL-SCNC: 25 MMOL/L (ref 21–32)
CREAT SERPL-MCNC: 0.76 MG/DL (ref 0.55–1.02)
GLUCOSE SERPL-MCNC: 89 MG/DL (ref 65–100)
HGB BLD-MCNC: 11.2 G/DL (ref 11.5–16)
POTASSIUM SERPL-SCNC: 4.3 MMOL/L (ref 3.5–5.1)
SODIUM SERPL-SCNC: 139 MMOL/L (ref 136–145)

## 2018-09-22 PROCEDURE — 36415 COLL VENOUS BLD VENIPUNCTURE: CPT | Performed by: PHYSICIAN ASSISTANT

## 2018-09-22 PROCEDURE — 80048 BASIC METABOLIC PNL TOTAL CA: CPT | Performed by: PHYSICIAN ASSISTANT

## 2018-09-22 PROCEDURE — 85018 HEMOGLOBIN: CPT | Performed by: PHYSICIAN ASSISTANT

## 2018-09-22 PROCEDURE — 97530 THERAPEUTIC ACTIVITIES: CPT

## 2018-09-22 PROCEDURE — 74011250637 HC RX REV CODE- 250/637: Performed by: PHYSICIAN ASSISTANT

## 2018-09-22 PROCEDURE — 74011000250 HC RX REV CODE- 250: Performed by: ORTHOPAEDIC SURGERY

## 2018-09-22 PROCEDURE — 74011250637 HC RX REV CODE- 250/637: Performed by: ORTHOPAEDIC SURGERY

## 2018-09-22 PROCEDURE — 97116 GAIT TRAINING THERAPY: CPT

## 2018-09-22 PROCEDURE — 65270000029 HC RM PRIVATE

## 2018-09-22 RX ADMIN — GABAPENTIN 300 MG: 300 CAPSULE ORAL at 08:33

## 2018-09-22 RX ADMIN — BENAZEPRIL HYDROCHLORIDE 10 MG: 10 TABLET ORAL at 08:33

## 2018-09-22 RX ADMIN — OXYCODONE HYDROCHLORIDE 10 MG: 5 TABLET ORAL at 08:33

## 2018-09-22 RX ADMIN — Medication 10 ML: at 21:47

## 2018-09-22 RX ADMIN — Medication 10 ML: at 12:29

## 2018-09-22 RX ADMIN — PANTOPRAZOLE SODIUM 40 MG: 40 TABLET, DELAYED RELEASE ORAL at 08:33

## 2018-09-22 RX ADMIN — PRAVASTATIN SODIUM 40 MG: 20 TABLET ORAL at 21:46

## 2018-09-22 RX ADMIN — Medication 1 TABLET: at 18:48

## 2018-09-22 RX ADMIN — OXYCODONE HYDROCHLORIDE 10 MG: 5 TABLET ORAL at 12:30

## 2018-09-22 RX ADMIN — GABAPENTIN 900 MG: 300 CAPSULE ORAL at 21:46

## 2018-09-22 RX ADMIN — OXYCODONE HYDROCHLORIDE 10 MG: 5 TABLET ORAL at 02:19

## 2018-09-22 RX ADMIN — OXYCODONE HYDROCHLORIDE 10 MG: 5 TABLET ORAL at 18:48

## 2018-09-22 RX ADMIN — POLYETHYLENE GLYCOL 3350 17 G: 17 POWDER, FOR SOLUTION ORAL at 08:32

## 2018-09-22 RX ADMIN — Medication 1 TABLET: at 08:33

## 2018-09-22 RX ADMIN — GABAPENTIN 300 MG: 300 CAPSULE ORAL at 12:30

## 2018-09-22 RX ADMIN — OXYCODONE HYDROCHLORIDE 10 MG: 5 TABLET ORAL at 22:58

## 2018-09-22 NOTE — PROGRESS NOTES
Primary Nurse Cony Rolle RN and MICHELLE torres performed a dual skin assessment on this patient Impairment noted- see wound doc flow sheet  Robert score is 21

## 2018-09-22 NOTE — PROGRESS NOTES
ORTHOPAEDIC CERVICAL FUSION PROGRESS NOTE    NAME:     Wood Lerma   :       1955   MRN:       [id number]   DATE:      2018    POD:              4 Days Post-Op  S/P:              Procedure(s):  C4-C7 ANTERIOR CERVICAL DISCECTOMY AND FUSION WITH INSTRUMENTATION    SUBJECTIVE:  Again, pt reports that she is feeling improved compared to yesterday. Bilateral hand pain improved   No numbness  Denies nausea/vomiting, headache, chest pain or shortness of breath  Pain controlled on Oxycodone    Recent Labs      18   0211   HGB  11.2*   NA  139   K  4.3   CL  107   CO2  25   BUN  14   CREA  0.76   GLU  89     Patient Vitals for the past 12 hrs:   BP Temp Pulse Resp SpO2   18 0728 124/79 98.8 °F (37.1 °C) 67 16 98 %   18 0321 124/71 97.8 °F (36.6 °C) 62 18 93 %   18 2335 131/63 98 °F (36.7 °C) 60 18 96 %       Exam:  VISTA collar inplace / intact  Dressings clean and dry  Strength/ROM of bilat upper ext is stable. Neuro exam stable  Neuro intact to sensation  BL UEs NVID  Cranial nerves grossly intact. Clear speech. LEs w/ nml strength and sensation.  Bilat LEs are NVID      PLAN:  Continue PO pain medications as needed  Chloraseptic spray at bedside  Tolerating diet  Out of bed w/ assist  PT/OT  Will continue to monitor pt over the weekend, likely d/c to Rehab on Monday      Patience Ortega [place]  Orthopaedic Surgery  Physician Assistant to Dr. France Santana

## 2018-09-22 NOTE — PROGRESS NOTES
Problem: Mobility Impaired (Adult and Pediatric)  Goal: *Acute Goals and Plan of Care (Insert Text)  Physical Therapy Goals  Initiated 9/19/2018  1. Patient will move from supine to sit and sit to supine  in bed with minimal assistance/contact guard assist within 7 day(s). 2.  Patient will transfer from bed to chair and chair to bed with minimal assistance/contact guard assist using the least restrictive device within 7 day(s). 3.  Patient will perform sit to stand with minimal assistance/contact guard assist within 7 day(s). 4.  Patient will ambulate with minimal assistance/contact guard assist for 100 feet with the least restrictive device within 7 day(s). 5.  Patient will ascend/descend 4 stairs with 1 handrail(s) with minimal assistance/contact guard assist within 7 day(s). physical Therapy TREATMENT  Patient: Nicole Olivarez (64 y.o. female)  Date: 9/22/2018  Diagnosis: Acute cervical radiculopathy [M54.12]  Cervical spinal stenosis [M48.02] <principal problem not specified>  Procedure(s) (LRB):  C4-C7 ANTERIOR CERVICAL DISCECTOMY AND FUSION WITH INSTRUMENTATION (N/A) 4 Days Post-Op  Precautions:    Chart, physical therapy assessment, plan of care and goals were reviewed. ASSESSMENT:  Pt comes to sit with mod to max assist.Pt with limited use of right UE. Pt min to mod assist sit to stand. Pt ambulated 70ft x 2 with single point cane CGA. Pt unsteady at times with increased lateral sway. Pt is at increased risk for falls. Pt left sitting . Continue goals. Progression toward goals:  []    Improving appropriately and progressing toward goals  [x]    Improving slowly and progressing toward goals  []    Not making progress toward goals and plan of care will be adjusted     PLAN:  Patient continues to benefit from skilled intervention to address the above impairments. Continue treatment per established plan of care.   Discharge Recommendations:  Rehab  Further Equipment Recommendations for Discharge: straight cane     SUBJECTIVE:       OBJECTIVE DATA SUMMARY:   Critical Behavior:  Neurologic State: Alert, Eyes open spontaneously  Orientation Level: Oriented X4  Cognition: Appropriate decision making, Appropriate for age attention/concentration, Appropriate safety awareness, Follows commands     Functional Mobility Training:  Bed Mobility:     Supine to Sit: Maximum assistance; Moderate assistance              Transfers:     Stand to Sit: Minimum assistance; Moderate assistance                             Balance:  Sitting: Intact  Sitting - Static: Fair (occasional)  Standing: With support  Ambulation/Gait Training:     Assistive Device: Gait belt;Cane, straight  70ft x 2  Ambulation - Level of Assistance: Contact guard assistance        Gait Abnormalities: Decreased step clearance; Path deviations;Trunk sway increased        Base of Support: Narrowed     Speed/Lauryn: Pace decreased (<100 feet/min)  Step Length: Left shortened;Right shortened                    Pain:  Pain Scale 1: Numeric (0 - 10)  Pain Intensity 1: 6              Activity Tolerance:   Pt tolerated treatment well. Please refer to the flowsheet for vital signs taken during this treatment.   After treatment:   [x]    Patient left in no apparent distress sitting up in chair  []    Patient left in no apparent distress in bed  []    Call bell left within reach  []    Nursing notified  []    Caregiver present  []    Bed alarm activated    COMMUNICATION/COLLABORATION:   The patients plan of care was discussed with: Physical Therapist    Chris Modi PTA   Time Calculation: 23 mins

## 2018-09-22 NOTE — PROGRESS NOTES
Bedside and Verbal shift change report given to Brant Hurst (oncoming nurse) by [facility] (offgoing nurse). Report included the following information SBAR, Kardex, Intake/Output, MAR, Recent Results and Med Rec Status.

## 2018-09-23 LAB
ANION GAP SERPL CALC-SCNC: 7 MMOL/L (ref 5–15)
BUN SERPL-MCNC: 12 MG/DL (ref 6–20)
BUN/CREAT SERPL: 14 (ref 12–20)
CALCIUM SERPL-MCNC: 8.7 MG/DL (ref 8.5–10.1)
CHLORIDE SERPL-SCNC: 108 MMOL/L (ref 97–108)
CO2 SERPL-SCNC: 28 MMOL/L (ref 21–32)
CREAT SERPL-MCNC: 0.83 MG/DL (ref 0.55–1.02)
GLUCOSE SERPL-MCNC: 97 MG/DL (ref 65–100)
HGB BLD-MCNC: 11.6 G/DL (ref 11.5–16)
POTASSIUM SERPL-SCNC: 4.1 MMOL/L (ref 3.5–5.1)
SODIUM SERPL-SCNC: 143 MMOL/L (ref 136–145)

## 2018-09-23 PROCEDURE — 74011250637 HC RX REV CODE- 250/637: Performed by: PHYSICIAN ASSISTANT

## 2018-09-23 PROCEDURE — 74011250637 HC RX REV CODE- 250/637: Performed by: ORTHOPAEDIC SURGERY

## 2018-09-23 PROCEDURE — 85018 HEMOGLOBIN: CPT | Performed by: PHYSICIAN ASSISTANT

## 2018-09-23 PROCEDURE — 74011000250 HC RX REV CODE- 250: Performed by: ORTHOPAEDIC SURGERY

## 2018-09-23 PROCEDURE — 80048 BASIC METABOLIC PNL TOTAL CA: CPT | Performed by: PHYSICIAN ASSISTANT

## 2018-09-23 PROCEDURE — 36415 COLL VENOUS BLD VENIPUNCTURE: CPT | Performed by: PHYSICIAN ASSISTANT

## 2018-09-23 PROCEDURE — 74011250636 HC RX REV CODE- 250/636: Performed by: PHYSICIAN ASSISTANT

## 2018-09-23 PROCEDURE — 65270000029 HC RM PRIVATE

## 2018-09-23 RX ORDER — DEXAMETHASONE SODIUM PHOSPHATE 4 MG/ML
10 INJECTION, SOLUTION INTRA-ARTICULAR; INTRALESIONAL; INTRAMUSCULAR; INTRAVENOUS; SOFT TISSUE
Status: COMPLETED | OUTPATIENT
Start: 2018-09-23 | End: 2018-09-23

## 2018-09-23 RX ADMIN — Medication 1 TABLET: at 17:08

## 2018-09-23 RX ADMIN — OXYCODONE HYDROCHLORIDE 10 MG: 5 TABLET ORAL at 04:39

## 2018-09-23 RX ADMIN — Medication 1 TABLET: at 08:38

## 2018-09-23 RX ADMIN — Medication 10 ML: at 14:30

## 2018-09-23 RX ADMIN — Medication 5 ML: at 22:00

## 2018-09-23 RX ADMIN — GABAPENTIN 300 MG: 300 CAPSULE ORAL at 08:38

## 2018-09-23 RX ADMIN — PANTOPRAZOLE SODIUM 40 MG: 40 TABLET, DELAYED RELEASE ORAL at 06:26

## 2018-09-23 RX ADMIN — DEXAMETHASONE SODIUM PHOSPHATE 10 MG: 4 INJECTION, SOLUTION INTRAMUSCULAR; INTRAVENOUS at 12:11

## 2018-09-23 RX ADMIN — PRAVASTATIN SODIUM 40 MG: 20 TABLET ORAL at 21:10

## 2018-09-23 RX ADMIN — OXYCODONE HYDROCHLORIDE 15 MG: 5 TABLET ORAL at 21:14

## 2018-09-23 RX ADMIN — OXYCODONE HYDROCHLORIDE 15 MG: 5 TABLET ORAL at 17:07

## 2018-09-23 RX ADMIN — OXYCODONE HYDROCHLORIDE 15 MG: 5 TABLET ORAL at 12:09

## 2018-09-23 RX ADMIN — POLYETHYLENE GLYCOL 3350 17 G: 17 POWDER, FOR SOLUTION ORAL at 08:37

## 2018-09-23 RX ADMIN — GABAPENTIN 900 MG: 300 CAPSULE ORAL at 21:11

## 2018-09-23 RX ADMIN — GABAPENTIN 300 MG: 300 CAPSULE ORAL at 14:30

## 2018-09-23 NOTE — PROGRESS NOTES
Bedside shift change report given to Henry Palma by Ángel Murdock. Report included the following information SBAR, Kardex, Intake/Output and Recent Results.

## 2018-09-23 NOTE — PROGRESS NOTES
CM posted updated PT/OT and MD notes to [address] [place] [address] [facility]. They will submit these updates to patients insurance company in the morning.

## 2018-09-23 NOTE — PROGRESS NOTES
ORTHOPAEDIC CERVICAL FUSION PROGRESS NOTE    NAME:     Clyde Santana   :       1955   MRN:       [id number]   DATE:      2018    POD:              5 Days Post-Op  S/P:              Procedure(s):  C4-C7 ANTERIOR CERVICAL DISCECTOMY AND FUSION WITH INSTRUMENTATION    SUBJECTIVE:  Pt reports that are unchanged from yesterday  She is feeling a little stiff today after sleeping in one position overnight  Weakness and bilateral hand pain unchanged  No numbness  Denies nausea/vomiting, headache, chest pain or shortness of breath      Recent Labs      18   0408   HGB  11.6   NA  143   K  4.1   CL  108   CO2  28   BUN  12   CREA  0.83   GLU  97     Patient Vitals for the past 12 hrs:   BP Temp Pulse Resp SpO2   18 0717 117/63 98 °F (36.7 °C) 65 16 94 %   18 0350 122/81 98.3 °F (36.8 °C) 64 16 96 %       Exam:  VISTA collar inplace / intact  Dressing clean and dry  Strength/ROM of bilat upper ext is stable. Neuro exam stable  Neuro intact to sensation  BL UEs NVID  Cranial nerves grossly intact. Clear speech. LEs w/ nml strength and sensation.  Bilat LEs are NVID      PLAN:  Decadron x 1 dose  Continue PO pain medications as needed  Tolerating diet  Out of bed w/ assist  PT/OT  Will continue to monitor pt over the weekend, likely d/c to Rehab on Monday      Tj Curry, [address]  Orthopaedic Surgery  Physician Assistant to Dr. Arturo Nguyen

## 2018-09-23 NOTE — PROGRESS NOTES
Bedside and Verbal shift change report given to [address] [place] [address] (oncoming nurse) by Mckenna Stauffer RN (offgoing nurse). Report included the following information SBAR, Kardex, OR Summary, Procedure Summary, Intake/Output, MAR and Recent Results.

## 2018-09-24 VITALS
HEART RATE: 65 BPM | SYSTOLIC BLOOD PRESSURE: 120 MMHG | BODY MASS INDEX: 35.94 KG/M2 | WEIGHT: 193.34 LBS | TEMPERATURE: 97.7 F | RESPIRATION RATE: 14 BRPM | OXYGEN SATURATION: 99 % | DIASTOLIC BLOOD PRESSURE: 76 MMHG

## 2018-09-24 LAB
ANION GAP SERPL CALC-SCNC: 7 MMOL/L (ref 5–15)
BUN SERPL-MCNC: 12 MG/DL (ref 6–20)
BUN/CREAT SERPL: 16 (ref 12–20)
CALCIUM SERPL-MCNC: 9.1 MG/DL (ref 8.5–10.1)
CHLORIDE SERPL-SCNC: 107 MMOL/L (ref 97–108)
CO2 SERPL-SCNC: 29 MMOL/L (ref 21–32)
CREAT SERPL-MCNC: 0.76 MG/DL (ref 0.55–1.02)
GLUCOSE SERPL-MCNC: 131 MG/DL (ref 65–100)
HGB BLD-MCNC: 11.6 G/DL (ref 11.5–16)
POTASSIUM SERPL-SCNC: 4.3 MMOL/L (ref 3.5–5.1)
SODIUM SERPL-SCNC: 143 MMOL/L (ref 136–145)

## 2018-09-24 PROCEDURE — 80048 BASIC METABOLIC PNL TOTAL CA: CPT | Performed by: PHYSICIAN ASSISTANT

## 2018-09-24 PROCEDURE — 97535 SELF CARE MNGMENT TRAINING: CPT

## 2018-09-24 PROCEDURE — 97116 GAIT TRAINING THERAPY: CPT

## 2018-09-24 PROCEDURE — 97530 THERAPEUTIC ACTIVITIES: CPT

## 2018-09-24 PROCEDURE — 74011250637 HC RX REV CODE- 250/637: Performed by: ORTHOPAEDIC SURGERY

## 2018-09-24 PROCEDURE — 74011000250 HC RX REV CODE- 250: Performed by: ORTHOPAEDIC SURGERY

## 2018-09-24 PROCEDURE — 74011250637 HC RX REV CODE- 250/637: Performed by: PHYSICIAN ASSISTANT

## 2018-09-24 PROCEDURE — 90471 IMMUNIZATION ADMIN: CPT

## 2018-09-24 PROCEDURE — 90686 IIV4 VACC NO PRSV 0.5 ML IM: CPT | Performed by: ORTHOPAEDIC SURGERY

## 2018-09-24 PROCEDURE — 36415 COLL VENOUS BLD VENIPUNCTURE: CPT | Performed by: PHYSICIAN ASSISTANT

## 2018-09-24 PROCEDURE — 85018 HEMOGLOBIN: CPT | Performed by: PHYSICIAN ASSISTANT

## 2018-09-24 PROCEDURE — 74011250636 HC RX REV CODE- 250/636: Performed by: ORTHOPAEDIC SURGERY

## 2018-09-24 RX ORDER — NALOXONE HYDROCHLORIDE 4 MG/.1ML
1 SPRAY NASAL AS NEEDED
Qty: 2 EACH | Refills: 5 | Status: SHIPPED | OUTPATIENT
Start: 2018-09-24

## 2018-09-24 RX ORDER — OXYCODONE HYDROCHLORIDE 15 MG/1
15 TABLET ORAL
Qty: 42 TAB | Refills: 0 | Status: SHIPPED | OUTPATIENT
Start: 2018-09-24

## 2018-09-24 RX ORDER — CYCLOBENZAPRINE HCL 10 MG
10 TABLET ORAL
Qty: 60 TAB | Refills: 2 | Status: SHIPPED | OUTPATIENT
Start: 2018-09-24

## 2018-09-24 RX ORDER — GABAPENTIN 300 MG/1
CAPSULE ORAL
Qty: 150 CAP | Refills: 2 | Status: SHIPPED | OUTPATIENT
Start: 2018-09-24

## 2018-09-24 RX ADMIN — OXYCODONE HYDROCHLORIDE 15 MG: 5 TABLET ORAL at 04:16

## 2018-09-24 RX ADMIN — Medication 1 TABLET: at 08:54

## 2018-09-24 RX ADMIN — PANTOPRAZOLE SODIUM 40 MG: 40 TABLET, DELAYED RELEASE ORAL at 06:46

## 2018-09-24 RX ADMIN — GABAPENTIN 300 MG: 300 CAPSULE ORAL at 08:54

## 2018-09-24 RX ADMIN — INFLUENZA VIRUS VACCINE 0.5 ML: 15; 15; 15; 15 SUSPENSION INTRAMUSCULAR at 11:54

## 2018-09-24 RX ADMIN — Medication 5 ML: at 06:00

## 2018-09-24 RX ADMIN — OXYCODONE HYDROCHLORIDE 15 MG: 5 TABLET ORAL at 08:53

## 2018-09-24 RX ADMIN — CYCLOBENZAPRINE HYDROCHLORIDE 10 MG: 10 TABLET, FILM COATED ORAL at 08:53

## 2018-09-24 RX ADMIN — POLYETHYLENE GLYCOL 3350 17 G: 17 POWDER, FOR SOLUTION ORAL at 08:54

## 2018-09-24 NOTE — PROGRESS NOTES
ORTHOPAEDIC CERVICAL FUSION PROGRESS NOTE    NAME:     Kelvin Prasad   :       1955   MRN:       [id number]   DATE:      2018    POD:              6 Days Post-Op  S/P:              Procedure(s):  C4-C7 ANTERIOR CERVICAL DISCECTOMY AND FUSION WITH INSTRUMENTATION    SUBJECTIVE:  Pt reports that she's feeling a little better compared to yesterday  Weakness and bilateral hand pain seems a little better according to pt  No numbness  Denies nausea/vomiting, headache, chest pain or shortness of breath    Recent Labs      18   0408   HGB  11.6   NA  143   K  4.3   CL  107   CO2  29   BUN  12   CREA  0.76   GLU  131*     Patient Vitals for the past 12 hrs:   BP Temp Pulse Resp SpO2   18 0410 147/79 97.9 °F (36.6 °C) 67 16 94 %   18 2338 126/69 97.9 °F (36.6 °C) 71 16 97 %   18 2117 140/80 97.5 °F (36.4 °C) 76 16 96 %       Exam:  VISTA collar inplace / intact  Dressing clean and dry  Strength/ROM of bilat upper ext is stable. Neuro exam stable  Neuro intact to sensation  BL UEs NVID  Cranial nerves grossly intact. Clear speech. LEs w/ nml strength and sensation.  Bilat LEs are NVID      PLAN:  Continue PO pain medications as needed  Tolerating diet  Out of bed w/ assist  PT/OT  Will likely d/c to Rehab today      Arnulfo Parryma  Orthopaedic Surgery  Physician Assistant to Dr. Julee Blunt

## 2018-09-24 NOTE — PROGRESS NOTES
ORTHOPAEDIC CERVICAL FUSION PROGRESS NOTE     NAME:     Clyde Santana   :                   1955   MRN:                   [id number]   DATE:      2018     POD:              1 Day Post-Op  S/P:                          Procedure(s):  C4-C7 ANTERIOR CERVICAL DISCECTOMY AND FUSION WITH INSTRUMENTATION     SUBJECTIVE:  C/O weakness to her R UE with raising the shoulder (abduction)  Having bilateral hand pain   No numbness  Denies nausea/vomiting, headache, chest pain or shortness of breath         Recent Labs   [facility]  18   0626   HGB  11.8   NA  142   K  4.3   CL  108   CO2  25   BUN  9   CREA  0.79   GLU  123*      Patient Vitals for the past 12 hrs:    BP Temp Pulse Resp SpO2   18 0720 153/80 98.2 °F (36.8 °C) (!) 59 14 99 %   18 0300 144/70 98.2 °F (36.8 °C) 66 16 98 %   18 2311 160/73 97.7 °F (36.5 °C) 63 16 98 %         Exam:  VISTA collar inplace / intact  Dressings clean and dry  Weakness noted to the R UE in the C5 distribution c/w C5 motor palsy. Positive strength/ROM bilat upper ext. Equal  strength bilat  Neuro intact to sensation  BL UEs NVID  Cranial nerves grossly intact. Clear speech. LEs w/ nml strength and sensation.  Bilat LEs are NVID     PLAN:  IV decadron  Continue PO pain medications as needed  Chloraseptic spray at bedside  Advance diet as tolerated  Out of bed w/ assist        Tj Curry, [address]  Orthopaedic Surgery  Physician Assistant to Dr. Arturo Nguyen

## 2018-09-24 NOTE — PROGRESS NOTES
9/24/2018     11:13 AM   Auth received for [facility] in [place]. They can accept pt following lunch. Pt's  can transport. Nurses, please call report [phone]. No additional DC service needs indicated. 8:53 AM  Pt discharge noted.  [facility] will need to resubmit for auth this am.

## 2018-09-24 NOTE — DISCHARGE SUMMARY
DISCHARGE SUMMARY     Patient: Marcelino Torres                             Medical Record Number: [id number]                : 1955  Age: 61 y.o. Admit Date: 2018  Discharge Date: 2018  Admission Diagnosis: Acute cervical radiculopathy [M54.12]  Cervical spinal stenosis [M48.02]  Discharge Diagnosis: Acute cervical radiculopathy [M54.12]  Procedures: Procedure(s):  C4-C7 ANTERIOR CERVICAL DISCECTOMY AND FUSION WITH INSTRUMENTATION  Surgeon: Chiquita Taylor MD  Anesthesia: General  Complications: None     History of Present Illness: Marcelino Torres is a 61 y.o. female with a history of intractable neck pain and radiculopathy. Despite conservative management and after clinical and radiographic evaluation, it was determined that she suffered from cervical stenosis and would benefit from Procedure(s):  C4-C7 ANTERIOR CERVICAL DISCECTOMY AND FUSION WITH INSTRUMENTATION, which she consented to undergo after a discussion of the risks, benefits, alternatives, rehab concerns, and potential complications of surgery. Hospital Course: Dionne Adams tolerated the procedure well. She was transferred  to the recovery room in stable condition. After a brief stay, the patient was then transferred to the Orthopedic floor. On postoperative day #1, the dressing was clean and dry and she was neurovascularly intact. The patient was afebrile and vital signs were stable. Marcelino Torres developed some post-operative weakness. A Cervical spine MRI was ordered. Results were discussed with patient and her - her weakness was due to a right C5 motor palsy and the cord signal changes seen on her MRI. Patient did improve and felt better with treatment including IV steroids and pain control. Marcelino Torres was deemed able to be discharged to Rehab in stable condition on postoperative day 6.   She was provided with routine postoperative instructions and advised to follow up in my office in 2 weeks following discharge from the hospital.  She was given a brace and prescriptions for medication to control post-operative symptoms. Discharge Medications:  Current Discharge Medication List      START taking these medications    Details   gabapentin (NEURONTIN) 300 mg capsule Take 1 capsule PO in the morning and afternoon, then take 3 capsules PO at bedtime  Qty: 150 Cap, Refills: 2      oxyCODONE IR (OXY-IR) 15 mg immediate release tablet Take 1 Tab by mouth every four (4) hours as needed (post-operative pain). Max Daily Amount: 90 mg.  Qty: 42 Tab, Refills: 0    Associated Diagnoses: Cervical stenosis of spinal canal; Acute postoperative pain      naloxone (NARCAN) 4 mg/actuation nasal spray 1 [place] by IntraNASal route as needed. Apply 1 nasal spray to one nostril; may repeat every 2 to 3 minutes in alternating nostrils until medical assistance becomes available  Indications: OPIATE-INDUCED RESPIRATORY DEPRESSION, Opioid Toxicity  Qty: 2 Each, Refills: 5      senna-docusate (PERICOLACE) 8.6-50 mg per tablet Take 1 Tab by mouth two (2) times a day for 30 days. Qty: 60 Tab, Refills: 0         CONTINUE these medications which have CHANGED    Details   cyclobenzaprine (FLEXERIL) 10 mg tablet Take 1 Tab by mouth three (3) times daily as needed for Muscle Spasm(s). Qty: 60 Tab, Refills: 2         CONTINUE these medications which have NOT CHANGED    Details   pravastatin (PRAVACHOL) 40 mg tablet Take 40 mg by mouth nightly. pantoprazole (PROTONIX) 40 mg tablet Take 40 mg by mouth daily. benazepril (LOTENSIN) 10 mg tablet Take 10 mg by mouth daily.          STOP taking these medications       gabapentin (NEURONTIN) 600 mg tablet Comments:   Reason for Stopping:         traMADol (ULTRAM) 50 mg tablet Comments:   Reason for Stopping:               LEIDA Echeverria  9/24/2018  Orthopaedic Surgery  Physician Assistant to Dr. Bessie Riddle

## 2018-09-24 NOTE — PROGRESS NOTES
Problem: Patient Education: Go to Patient Education Activity  Goal: Patient/Family Education  physical Therapy TREATMENT  Patient: Juan Manuel Mcnulty (64 y.o. female)  Date: 9/24/2018  Diagnosis: Acute cervical radiculopathy [M54.12]  Cervical spinal stenosis [M48.02] <principal problem not specified>  Procedure(s) (LRB):  C4-C7 ANTERIOR CERVICAL DISCECTOMY AND FUSION WITH INSTRUMENTATION (N/A) 6 Days Post-Op  Precautions:    Chart, physical therapy assessment, plan of care and goals were reviewed. ASSESSMENT:  Pt comes to sit mod to max assist with neck brace in place. Pt CGA sit to stand from bed. Pt with limited use of right UE. Pt ambulated 70Ft x 2 with a cane CGA to min assist.Pt is unsteady making turns. Pt remains a fall risk and struggles with bed mobility secondary to weak right UE. Pt left sitting in chair. Continue goals. Progression toward goals:  []    Improving appropriately and progressing toward goals  [x]    Improving slowly and progressing toward goals  []    Not making progress toward goals and plan of care will be adjusted     PLAN:  Patient continues to benefit from skilled intervention to address the above impairments. Continue treatment per established plan of care. Discharge Recommendations:  Inpatient Rehab  Further Equipment Recommendations for Discharge:   straight cane     SUBJECTIVE:       OBJECTIVE DATA SUMMARY:   Critical Behavior:  Neurologic State: Alert  Orientation Level: Oriented X4  Cognition: Follows commands     Functional Mobility Training:  Bed Mobility:     Supine to Sit: Moderate assistance;Maximum assistance              Transfers:  Sit to Stand: Contact guard assistance  Stand to Sit: Contact guard assistance                             Balance:  Sitting: Intact  Sitting - Static: Fair (occasional); Good (unsupported)  Standing: With support  Ambulation/Gait Training:  Distance (ft): 140 Feet (ft)  Assistive Device: Cane, straight;;Gait belt  Ambulation - Level of Assistance: Contact guard assistance;Minimal assistance        Gait Abnormalities: Trunk sway increased;Decreased step clearance        Base of Support: Narrowed     Speed/Lauryn: Pace decreased (<100 feet/min)  Step Length: Left shortened;Right shortened                Pain:  Pain Scale 1: Numeric (0 - 10)  Pain Intensity 1: 8  Pain Location 1: Neck;Arm     Pain Description 1: Aching; Shooting  Pain Intervention(s) 1: Medication (see MAR)  Activity Tolerance:   Pt tolerated treatment fairly well. Please refer to the flowsheet for vital signs taken during this treatment.   After treatment:   [x]    Patient left in no apparent distress sitting up in chair  []    Patient left in no apparent distress in bed  []    Call bell left within reach  []    Nursing notified  []    Caregiver present  []    Bed alarm activated    COMMUNICATION/COLLABORATION:   The patients plan of care was discussed with: Physical Therapist    Brianna Palma PTA   Time Calculation: 23 mins

## 2018-09-24 NOTE — ROUTINE PROCESS
Verbal shift change report given to [facility] (oncoming nurse) by Isaías Lopez (offgoing nurse). Report included the following information SBAR, Kardex, MAR and Recent Results.

## 2018-09-24 NOTE — PROGRESS NOTES
Problem: Self Care Deficits Care Plan (Adult)  Goal: *Acute Goals and Plan of Care (Insert Text)  Occupational Therapy Goals  Initiated 9/20/2018  1. Patient will perform self-feeding with min A with LUE using built up  and distal support PRN within 7 days. 2.  Patient will perform UB body dressing with minimal assistance using most appropriate DME within 7 days. 3.  Patient will toileting tasks at minimal assistance/contact guard assist within 7 days. 4.  Patient will complete basic grooming with LUE in sitting at minimal assistance/contact guard assist within 7 days. 5.  Patient will verbalize/demonstrate 3/3 back precautions during ADL tasks without cues within 7 days. Occupational Therapy TREATMENT  Patient: Brandon Chowdhury (64 y.o. female)  Date: 9/24/2018  Diagnosis: Acute cervical radiculopathy [M54.12]  Cervical spinal stenosis [M48.02] <principal problem not specified>  Procedure(s) (LRB):  C4-C7 ANTERIOR CERVICAL DISCECTOMY AND FUSION WITH INSTRUMENTATION (N/A) 6 Days Post-Op  Precautions:    Chart, occupational therapy assessment, plan of care, and goals were reviewed. ASSESSMENT:  Pt seated in chair wanting to dress for discharge to rehab. Pt needing moderate assist to bring shirt around L backside, she was able to button blouse but needed assist to line buttons and button holes correctly. Pt able to don underwear and pants with moderate assist over R LE. She fed herself grilled cheese sandwich with use of L UE. Pt left with RN and recommend rehab. Progression toward goals:  []       Improving appropriately and progressing toward goals  [x]       Improving slowly and progressing toward goals  []       Not making progress toward goals and plan of care will be adjusted     PLAN:  Patient continues to benefit from skilled intervention to address the above impairments. Continue treatment per established plan of care.   Discharge Recommendations:  Rehab  Further Equipment Recommendations for Discharge: None     SUBJECTIVE:   Patient stated I have to move slow or I get dizzy.     OBJECTIVE DATA SUMMARY:   Cognitive/Behavioral Status:  Neurologic State: Alert  Orientation Level: Oriented X4  Cognition: Appropriate decision making             Functional Mobility and Transfers for ADLs:  Bed Mobility:  Supine to Sit: Moderate assistance;Maximum assistance    Transfers:  Sit to Stand: Contact guard assistance          Balance:  Sitting: Intact  Sitting - Static: Fair (occasional); Good (unsupported)  Standing: With support    ADL Intervention:                      Upper Body Dressing Assistance  Dressing Assistance: Minimum assistance; Moderate assistance  Front Opened Shirt: Moderate assistance (bringing shirt around L shoulder and buttoning correctly)  Cues: Physical assistance    Lower Body Dressing Assistance  Dressing Assistance: Moderate assistance  Pants With Elastic Waist: Moderate assistance (over R LE)  Leg Crossed Method Used: No  Position Performed: Seated in chair  Cues: Don;Physical assistance;Verbal cues provided          Pain:  Pain Scale 1: Numeric (0 - 10)  Pain Intensity 1: 2  Pain Location 1: Neck;Arm     Pain Description 1: Aching; Shooting  Pain Intervention(s) 1: Repositioned;Relaxation technique  Activity Tolerance:   Fair  Please refer to the flowsheet for vital signs taken during this treatment.   After treatment:   [x] Patient left in no apparent distress sitting up in chair  [] Patient left in no apparent distress in bed  [x] Call bell left within reach  [x] Nursing notified  [] Caregiver present  [] Bed alarm activated    COMMUNICATION/COLLABORATION:   The patients plan of care was discussed with: Occupational Therapist and Registered Nurse    REBECCA Brown  Time Calculation: 12 mins

## 2018-09-24 NOTE — PROGRESS NOTES
Nurse handed patient 5 scripts including a script for Oxycodone TI. Patient received a copy of discharge instructions. All medications and instructions read and explained to her.  Verbalized understanding

## 2018-12-20 ENCOUNTER — OP HISTORICAL/CONVERTED ENCOUNTER (OUTPATIENT)
Dept: OTHER | Age: 63
End: 2018-12-20

## 2019-12-23 ENCOUNTER — OP HISTORICAL/CONVERTED ENCOUNTER (OUTPATIENT)
Dept: OTHER | Age: 64
End: 2019-12-23

## 2020-07-17 ENCOUNTER — OFFICE VISIT (OUTPATIENT)
Dept: NEUROLOGY | Age: 65
End: 2020-07-17

## 2020-07-17 VITALS
WEIGHT: 214 LBS | RESPIRATION RATE: 16 BRPM | DIASTOLIC BLOOD PRESSURE: 80 MMHG | HEART RATE: 70 BPM | SYSTOLIC BLOOD PRESSURE: 138 MMHG | BODY MASS INDEX: 36.54 KG/M2 | HEIGHT: 64 IN | TEMPERATURE: 97.8 F | OXYGEN SATURATION: 99 %

## 2020-07-17 DIAGNOSIS — R40.4 TRANSIENT ALTERATION OF AWARENESS: Primary | ICD-10-CM

## 2020-07-17 DIAGNOSIS — M54.2 NECK PAIN: ICD-10-CM

## 2020-07-17 DIAGNOSIS — R40.4 TRANSIENT ALTERATION OF AWARENESS: ICD-10-CM

## 2020-07-17 RX ORDER — TIZANIDINE 4 MG/1
4 TABLET ORAL
COMMUNITY

## 2020-07-17 RX ORDER — TIZANIDINE HYDROCHLORIDE 2 MG/1
CAPSULE, GELATIN COATED ORAL EVERY 8 HOURS
COMMUNITY
Start: 2018-12-21

## 2020-07-17 NOTE — PROGRESS NOTES
NEUROLOGY NEW PATIENT OFFICE CONSULTATION      7/17/2020    RE: Macy Soria         1955      REFERRED BY:  Unknown, Provider        CHIEF COMPLAINT:  This is Macy Soria is a 59 y.o. female left handed retiree who had concerns including New Patient (PCP reffered. Went to hosp bc fell asleep watching TV, woke up and vision was off and very fatigued, and urinated on herself.). HPI:       2 months ago, patient started episodes of painful muscle cramping of both hands lasting for 5 secs occurring 2/ day. Tried Flexeril with minimal benefit. (+) neck muscle spasms. June 24, 2020, patient was asleep and woke up with blurred vision and felt weak with severe pain in the stomach and sweating profusely. Patient was admitted at  [facility] CT head was said to be okay. April 2020, patient was at a [employer] shop and noted blurred vision was sweating and urinary incontinence. (+) shaking.         Previous tests done from outside  NCS of both UE (10/31/19) was said to be okay  EMG C5 radiculopathy    ROS  All other systems reviewed and are negative    Past Medical Hx  Past Medical History:   Diagnosis Date    Arthritis     GERD (gastroesophageal reflux disease)     H/O degenerative disc disease     Hyperlipemia     Hypertension     Nausea & vomiting     Obesity (BMI 30-39.9) 09/10/2018    BMI= 36    Sleep apnea     CPAP- has not used since weight loss    Vertigo    Nov 2019 - mild myelomalacia C4C5 with spinal fusion with rods c/o ortho Dr Abby Monroy (Pain in the neck and L UE)    Borderline DM  R leg pain    Social Hx  Social History     Socioeconomic History    Marital status:      Spouse name: Not on file    Number of children: Not on file    Years of education: Not on file    Highest education level: Not on file   Tobacco Use    Smoking status: Never Smoker    Smokeless tobacco: Never Used   Substance and Sexual Activity    Alcohol use: No    Drug use: No       Family Hx  Family History   Problem Relation Age of Onset    Heart Failure Mother         pacemaker    Hypertension Mother     Diabetes Mother     High Cholesterol Mother     Cancer Father         Bladder    High Cholesterol Father     Hypertension Father     No Known Problems Sister     Colon Cancer Brother     Alcohol abuse Brother         Cirrrohis    Arrhythmia Sister         Tachycardia    Diabetes Sister     Arthritis-osteo Sister        ALLERGIES  Allergies   Allergen Reactions    Codeine Other (comments)     Feels confused, \"makes me feel like I'm going out my head\"    Pcn [Penicillins] Itching    Tomato Itching       CURRENT MEDS  Current Outpatient Medications   Medication Sig Dispense Refill    tiZANidine (ZANAFLEX) 2 mg capsule every eight (8) hours.  tiZANidine (ZANAFLEX) 4 mg tablet Take 4 mg by mouth three (3) times daily as needed for Muscle Spasm(s).  gabapentin (NEURONTIN) 300 mg capsule Take 1 capsule PO in the morning and afternoon, then take 3 capsules PO at bedtime (Patient taking differently: Take 600 mg by mouth two (2) times a day. Take 1 capsule PO in the morning and afternoon, then take 3 capsules PO at bedtime) 150 Cap 2    pravastatin (PRAVACHOL) 40 mg tablet Take 40 mg by mouth nightly.  benazepril (LOTENSIN) 10 mg tablet Take 10 mg by mouth daily.  cyclobenzaprine (FLEXERIL) 10 mg tablet Take 1 Tab by mouth three (3) times daily as needed for Muscle Spasm(s). 60 Tab 2    oxyCODONE IR (OXY-IR) 15 mg immediate release tablet Take 1 Tab by mouth every four (4) hours as needed (post-operative pain). Max Daily Amount: 90 mg. 42 Tab 0    naloxone (NARCAN) 4 mg/actuation nasal spray 1 [place] by IntraNASal route as needed.  Apply 1 nasal spray to one nostril; may repeat every 2 to 3 minutes in alternating nostrils until medical assistance becomes available  Indications: OPIATE-INDUCED RESPIRATORY DEPRESSION, Opioid Toxicity 2 Each 5    pantoprazole (PROTONIX) 40 mg tablet Take 40 mg by mouth daily. PREVIOUS WORKUP: (reviewed)  IMAGING:    CT Results (recent):  No results found for this or any previous visit. MRI Results (recent):  Results from [place] encounter on 09/18/18   MRI CERV SPINE W WO CONT    Narrative EXAM:  MRI CERV SPINE W WO CONT      HISTORY: Upper extremity weakness bilaterally  INDICATION:  S/p C4-C7 ACDF with UE weakness bilaterally. COMPARISON: None    TECHNIQUE: MR imaging of the cervical spine was performed using the following  sequences: sagittal T1, T2, STIR;  axial T2, T1 prior to and following contrast  administration. CONTRAST:  18 mL of Dot[place]m. FINDINGS:    There is normal alignment of the cervical spine. Vertebral body heights are  maintained. Marrow signal is normal.    The craniocervical junction is intact. Minimal increased cord signal intensity  at C4-C5 may be related to degenerative change. No prior imaging study available  for comparison. There is no pathologic intrathecal enhancement. The paraspinal soft tissues are within normal limits. C2-C3:  Bulge. Facet arthropathy. Canal and foramina are patent. C3-C4:  Disc bulge/osteophyte. Mild facet arthropathy. Mild canal left foraminal  stenosis. C4-C5:  Disc bulge/osteophyte asymmetric to the right. Mild facet arthropathy. Mild canal and severe right foraminal stenosis. C5-C6:  Mild broad-based protrusion/osteophyte. Facet arthropathy. Minimal canal  stenosis. Severe bilateral foraminal stenosis. C6-C7:  Disc bulge/osteophyte asymmetric to the right. Increased cord signal  intensity centrally anteriorly may related to degenerative change. No  significant canal stenosis. Mild to moderate right foraminal stenosis. C7-T1:  No herniation or stenosis. Impression IMPRESSION:  Multilevel degenerative change. ACDF at C4-5 and C5-6. Mild canal and severe right foraminal stenosis at C4-5.     Minimal canal and severe bilateral foraminal stenosis at C5-6. Mild canal left foraminal stenosis C3-C4. Increased central cord signal intensity at C4-5 and at C5-C6 may be related to  degenerative change. No prior study is available for comparison. If a prior  imaging study can be made available an addendum to this report can be generated. No cord compression at this time. No severe canal stenosis. IR Results (recent):  No results found for this or any previous visit. VAS/US Results (recent):  No results found for this or any previous visit.         LABS (reviewed)  Results for orders placed or performed during the hospital encounter of 35/00/61   METABOLIC PANEL, BASIC   Result Value Ref Range    Sodium 142 136 - 145 mmol/L    Potassium 4.3 3.5 - 5.1 mmol/L    Chloride 108 97 - 108 mmol/L    CO2 25 21 - 32 mmol/L    Anion gap 9 5 - 15 mmol/L    Glucose 123 (H) 65 - 100 mg/dL    BUN 9 6 - 20 MG/DL    Creatinine 0.79 0.55 - 1.02 MG/DL    BUN/Creatinine ratio 11 (L) 12 - 20      GFR est AA >60 >60 ml/min/1.73m2    GFR est non-AA >60 >60 ml/min/1.73m2    Calcium 8.6 8.5 - 10.1 MG/DL   HEMOGLOBIN   Result Value Ref Range    HGB 11.8 11.5 - 16.0 g/dL   HEMOGLOBIN   Result Value Ref Range    HGB 12.1 11.5 - 26.6 g/dL   METABOLIC PANEL, BASIC   Result Value Ref Range    Sodium 148 (H) 136 - 145 mmol/L    Potassium 4.0 3.5 - 5.1 mmol/L    Chloride 112 (H) 97 - 108 mmol/L    CO2 28 21 - 32 mmol/L    Anion gap 8 5 - 15 mmol/L    Glucose 147 (H) 65 - 100 mg/dL    BUN 15 6 - 20 MG/DL    Creatinine 0.88 0.55 - 1.02 MG/DL    BUN/Creatinine ratio 17 12 - 20      GFR est AA >60 >60 ml/min/1.73m2    GFR est non-AA >60 >60 ml/min/1.73m2    Calcium 9.3 8.5 - 10.1 MG/DL   HEMOGLOBIN   Result Value Ref Range    HGB 11.2 (L) 11.5 - 94.5 g/dL   METABOLIC PANEL, BASIC   Result Value Ref Range    Sodium 139 136 - 145 mmol/L    Potassium 4.3 3.5 - 5.1 mmol/L    Chloride 107 97 - 108 mmol/L    CO2 25 21 - 32 mmol/L    Anion gap 7 5 - 15 mmol/L    Glucose 89 65 - 100 mg/dL    BUN 14 6 - 20 MG/DL    Creatinine 0.76 0.55 - 1.02 MG/DL    BUN/Creatinine ratio 18 12 - 20      GFR est AA >60 >60 ml/min/1.73m2    GFR est non-AA >60 >60 ml/min/1.73m2    Calcium 8.5 8.5 - 10.1 MG/DL   HEMOGLOBIN   Result Value Ref Range    HGB 11.6 11.5 - 34.5 g/dL   METABOLIC PANEL, BASIC   Result Value Ref Range    Sodium 143 136 - 145 mmol/L    Potassium 4.1 3.5 - 5.1 mmol/L    Chloride 108 97 - 108 mmol/L    CO2 28 21 - 32 mmol/L    Anion gap 7 5 - 15 mmol/L    Glucose 97 65 - 100 mg/dL    BUN 12 6 - 20 MG/DL    Creatinine 0.83 0.55 - 1.02 MG/DL    BUN/Creatinine ratio 14 12 - 20      GFR est AA >60 >60 ml/min/1.73m2    GFR est non-AA >60 >60 ml/min/1.73m2    Calcium 8.7 8.5 - 10.1 MG/DL   HEMOGLOBIN   Result Value Ref Range    HGB 11.6 11.5 - 94.0 g/dL   METABOLIC PANEL, BASIC   Result Value Ref Range    Sodium 143 136 - 145 mmol/L    Potassium 4.3 3.5 - 5.1 mmol/L    Chloride 107 97 - 108 mmol/L    CO2 29 21 - 32 mmol/L    Anion gap 7 5 - 15 mmol/L    Glucose 131 (H) 65 - 100 mg/dL    BUN 12 6 - 20 MG/DL    Creatinine 0.76 0.55 - 1.02 MG/DL    BUN/Creatinine ratio 16 12 - 20      GFR est AA >60 >60 ml/min/1.73m2    GFR est non-AA >60 >60 ml/min/1.73m2    Calcium 9.1 8.5 - 10.1 MG/DL       Physical Exam:     Visit Vitals  /80 (BP 1 Location: Left arm, BP Patient Position: Sitting)   Pulse 70   Temp 97.8 °F (36.6 °C)   Resp 16   Ht 5' 4\" (1.626 m)   Wt 97.1 kg (214 lb)   SpO2 99%   BMI 36.73 kg/m²     General:  Alert, cooperative, no distress. Head:  Normocephalic, without obvious abnormality, atraumatic. Eyes:  Conjunctivae/corneas clear. Lungs:  Heart:   Non labored breathing  Regular rate and rhythm, no carotid bruits   Abdomen:   Soft, non-distended   Extremities: Extremities normal, atraumatic, no cyanosis or edema. Pulses: 2+ and symmetric all extremities. Skin: Skin color, texture, turgor normal. No rashes or lesions.   Neurologic Exam     Gen: Attention normal Language: naming, repetition, fluency normal             Memory: intact recent and remote memory  Cranial Nerves:  I: smell Not tested   II: visual fields Full to confrontation   II: pupils Equal, round, reactive to light   II: optic disc No papilledema   III,VII: ptosis none   III,IV,VI: extraocular muscles  Full ROM   V: mastication normal   V: facial light touch sensation  normal   VII: facial muscle function   symmetric   VIII: hearing symmetric   IX: soft palate elevation  normal   XI: trapezius strength  5/5   XI: sternocleidomastoid strength 5/5   XI: neck flexion strength  5/5   XII: tongue  midline     Motor: normal bulk and tone, no tremor              Strength: 5/5 all four extremities  No muscle spasms noted  Sensory: intact to LT, PP, vibration, and JPS  Reflexes: 1+ throughout; Down going toes  Coordination: Good FTN and HTS  Gait: normal gait including tandem            Impression:     Macy Soria is a 59 y.o. female who  has a past medical history of Nov 2019 - mild myelomalacia C4C5 with spinal fusion with rods c/o ortho Dr Abby Monroy (Pain in the neck and L UE), borderline DM, Arthritis, GERD (gastroesophageal reflux disease), H/O degenerative disc disease, Hyperlipemia, Hypertension, Nausea & vomiting, Obesity (BMI 30-39.9) (09/10/2018), Sleep apnea, and Vertigo. who comes in with several concerns. 2 months ago, patient started episodes of painful muscle cramping of both hands lasting for 5 secs occurring 2/ day. Tried Flexeril with minimal benefit. (+) neck muscle spasms. Consideration includes related to history of cervical myelopathy s/p C5 fusion c/o Dr Abby Monroy. Second, on June 24, 2020, patient was asleep and woke up with blurred vision and felt weak with severe pain in the stomach and sweating profusely. Patient was admitted at  [facility] CT head was said to be okay. On April 2020, patient was at a beauty shop and noted blurred vision was sweating and urinary incontinence.  (+) shaking. Patient should be evaluated for seizure event. Other consideration includes adverse reaction to medication I.e.(Tizanidine)      RECOMMENDATIONS  1. I had a long discussion with patient. Discussed diagnosis, prognosis, pathophysiology and available treatment. Reviewed test results. All questions were answered. 2. Patient to get medical records at [facility] for my review  3. MRI brain looking for structural cause of her events and also to look for stroke  4. MRI cervical spine to look for interval worsening  5. Sleep deprived EEG  6. Advise to stop Tizanidine for now      Follow-up and Dispositions    · Return for review of results.             Thank you for the consultation      Sabina Kehr, MD  Diplomate, American Board of Psychiatry and Neurology  Diplomate, Neuromuscular Medicine  Diplomate, American Board of Electrodiagnostic Medicine        CC: Unknown, Provider  Fax: None

## 2020-07-17 NOTE — PROGRESS NOTES
Chief Complaint   Patient presents with    New Patient     PCP reffered. Went to hosp bc fell asleep watching TV, woke up and vision was off and very fatigued, and urinated on herself.      Visit Vitals  /80 (BP 1 Location: Left arm, BP Patient Position: Sitting)   Pulse 70   Temp 97.8 °F (36.6 °C)   Resp 16   Ht 5' 4\" (1.626 m)   Wt 97.1 kg (214 lb)   SpO2 99%   BMI 36.73 kg/m²

## 2020-07-23 ENCOUNTER — DOCUMENTATION ONLY (OUTPATIENT)
Dept: NEUROLOGY | Age: 65
End: 2020-07-23

## 2020-07-30 ENCOUNTER — TELEPHONE (OUTPATIENT)
Dept: NEUROLOGY | Age: 65
End: 2020-07-30

## 2020-07-30 NOTE — TELEPHONE ENCOUNTER
Patient's insurance calling for Peer to peer review with Dr. Seema Gallegos for MRI of Spine. MRI Brain was APPROVED. Insurance can be reached at [phone], option 4. Case #[id number].

## 2020-07-30 NOTE — TELEPHONE ENCOUNTER
----- Message from Phil VargasBrice sent at 7/28/2020  3:17 PM EDT -----  Regarding: dr leiva/ telephone  General Message/Vendor Calls    Caller's first and last name: pt      Reason for call: she needs to speak with the nurse in regards to her insurance not covering her mri      Callback required yes/no and why: yes      Best contact number(s): [phone]      Details to clarify the request:      Phil VargasBrice

## 2020-07-30 NOTE — TELEPHONE ENCOUNTER
Per Dr. Ester Gonzalez--  Jai Deng MD  You 1 hour ago (3:08 PM)       Lets do the MRI brain first since it was approved. Will decide to do MRI cervical spine if still unclear etiology       Called Cari at [facility], [place], [facility] back and left her a detailed message of MDs recommendations.

## 2020-07-31 ENCOUNTER — HOSPITAL ENCOUNTER (OUTPATIENT)
Dept: MRI IMAGING | Age: 65
End: 2020-07-31
Attending: PSYCHIATRY & NEUROLOGY
Payer: COMMERCIAL

## 2020-07-31 ENCOUNTER — HOSPITAL ENCOUNTER (OUTPATIENT)
Dept: MRI IMAGING | Age: 65
Discharge: HOME | End: 2020-07-31
Attending: PSYCHIATRY & NEUROLOGY
Payer: COMMERCIAL

## 2020-07-31 DIAGNOSIS — R40.4 TRANSIENT ALTERATION OF AWARENESS: ICD-10-CM

## 2020-07-31 PROCEDURE — 70551 MRI BRAIN STEM W/O DYE: CPT

## 2020-07-31 NOTE — TELEPHONE ENCOUNTER
Called patient back and left a detailed message stating MRI of brain was approved and per Dr. Cyndy Lozada wants her to have that first and we will see if we need to do the other MRI after that and then Dr. Evaristo Duran will do the peer to peer. I stated we recommend her to keep the MRI appt for today. Ginger Left           7/30/20 12:24 PM   Note      Patient's insurance calling for Peer to peer review with Dr. Evaristo Duran for MRI of Spine. MRI Brain was APPROVED. Insurance can be reached at [phone], option 4. Case #[id number].

## 2020-08-05 ENCOUNTER — OFFICE VISIT (OUTPATIENT)
Dept: NEUROLOGY | Age: 65
End: 2020-08-05
Payer: MEDICARE

## 2020-08-05 VITALS — TEMPERATURE: 97.7 F

## 2020-08-05 DIAGNOSIS — E66.01 SEVERE OBESITY (HCC): ICD-10-CM

## 2020-08-05 DIAGNOSIS — R40.4 TRANSIENT ALTERATION OF AWARENESS: ICD-10-CM

## 2020-08-05 PROCEDURE — 95816 EEG AWAKE AND DROWSY: CPT | Performed by: PSYCHIATRY & NEUROLOGY

## 2020-08-05 NOTE — PROCEDURES
EEG:      Date:  08/05/20    Requesting Physician:  Dana Maravilla MD    A sleep-deprived EEG is requested in this 42-year-old woman with transient alteration and awareness, including shaking and incontinence of urine, to evaluate for epileptiform abnormality. Medications:  Medications are said to include Neurontin, Zanaflex, Oxycodone, Narcan, Flexeril, Lotensin, Pravachol, Protonix. This tracing is obtained during the awake, drowsy and sleeping states. During wakefulness there are brief intermittent runs of posteriorly-dominant and symmetric low-to-medium amplitude 11 cycle per second activities which attenuate with eye opening. Lower-voltage faster-frequency activities are seen symmetrically over the anterior head regions. Hyperventilation is not performed due to COVID-19 precautions. Intermittent photic stimulation little alters the tracing. Stage 2 sleep is not attained. Interpretation: This sleep-deprived EEG recorded during the awake, drowsy and sleeping states is normal.  No epileptiform abnormalities are seen.

## 2020-08-07 ENCOUNTER — OFFICE VISIT (OUTPATIENT)
Dept: NEUROLOGY | Age: 65
End: 2020-08-07

## 2020-08-07 VITALS — TEMPERATURE: 97.9 F

## 2020-08-07 DIAGNOSIS — M54.2 NECK PAIN: Primary | ICD-10-CM

## 2020-08-07 NOTE — PROCEDURES
EMG/ NCS Report  [facility]  [address] [facility], [address] [place] [address] [address]   Ph: [phone]   FAX: [phone]/ [phone]  Test Date:  2020    Patient: Jluis Jara :  Physician: Arjun Pulido MD   Sex: Female Height: ' \" Ref Phys: Caridad Jimenez MD   ID#: [id number] Weight:  lbs. Technician: Bethany Clamp     Patient History / Exam:  Patient is coming for radiculopathy and neuropathy evaluation. Carmen Velazquez is a 59 y.o. female who  has a past medical history of 2019 - mild myelomalacia C4C5 with spinal fusion with rods c/o ortho Dr Radha Wiggins (Pain in the neck and L UE), borderline DM, Arthritis, GERD (gastroesophageal reflux disease), H/O degenerative disc disease, Hyperlipemia, Hypertension, Nausea & vomiting, Obesity (BMI 30-39.9) (09/10/2018), Sleep apnea, and Vertigo. who comes in with several concerns. 2 months ago, patient started episodes of painful muscle cramping of both hands lasting for 5 secs occurring 2/ day. Tried Flexeril with minimal benefit. (+) neck muscle spasms. Consideration includes related to history of cervical myelopathy s/p C5 fusion c/o Dr Radha Wiggins.      Exam: Patient awake, alert, follows commands, clear speech; hearing grossly intact; EOMI, (-) facial asymmetry, tongue midline; Motor: 5/5 in all extremities; Sensory: intact to LT, PP, JPS; DTRs +; Good FTN and HTS; Gait: stable      EMG & NCV Findings:  Evaluation of the left median motor and the right median motor nerves showed normal distal onset latency (L3.7, R3.0 ms), normal amplitude (L11.5, R12.0 mV), and normal conduction velocity (Elbow-Wrist, L59, R54 m/s). The left ulnar motor and the right ulnar motor nerves showed normal distal onset latency (L2.5, R2.5 ms), normal amplitude (L10.4, R9.1 mV), normal conduction velocity (B Elbow-Wrist, L51, R56 m/s), and normal conduction velocity (A Elbow-B Elbow, L63, R53 m/s).   The left median sensory, the right median sensory, the left radial sensory, the right radial sensory, the left ulnar sensory, and the right ulnar sensory nerves showed normal distal peak latency (L3.4, R3.0, L1.9, R2.0, L3.1, R2.8 ms) and normal amplitude (L55.8, R53.3, L78.1, R94.4, L49.1, R45.0 µV). The left median/ulnar (palm) comparison and the right median/ulnar (palm) comparison nerves showed normal distal onset latency (Median Palm, L1.7, R1.4 ms), normal distal peak latency (Median Palm, L2.2, R1.8 ms), normal amplitude (Median Palm, L44.5, R26.3 µV), normal distal onset latency (Ulnar Palm, L1.3, R0.9 ms), normal distal peak latency (Ulnar Palm, L1.8, R1.6 ms), and normal amplitude (Ulnar Palm, L13.3, R9.9 µV). All left vs. right side differences were within normal limits. All F Wave latencies were within normal limits. All F Wave left vs. right side latency differences were within normal limits. Needle evaluation of the right pronator teres muscle showed diminished recruitment, Incr Duration, and moderately increased polyphasic potentials. The right biceps and the right deltoid muscles showed diminished recruitment, Incr Duration, and increased motor unit amplitude. All remaining muscles (as indicated in the following table) showed no evidence of electrical instability. Impression:  ABNORMAL    Extensive electrodiagnostic examination of the right and left upper extremities, including palmar study, shows the followin) Evidence of a left median mononeuropathy at or distal to the wrist, consistent with a clinical diagnosis of carpal tunnel syndrome, minimal in degree electrically. 2) Chronic, without active, motor axon loss changes in muscles innervated by right C5/C6 roots/segments suggestive of an underlying chronic, right C5/C6 motor radiculopathy. Recommend radiological correlation.            Deneen Jessica MD  Diplomate, American Board of Psychiatry and Neurology  Diplomate, Neuromuscular Medicine  Diplomate, American Board of Electrodiagnostic Medicine  Director, [address] [place] [address] Accredited Laboratory with Exemplary Status          Nerve Conduction Studies  Anti Sensory Summary Table     Stim Site NR Peak (ms) Norm Peak (ms) P-T Amp (µV) Norm P-T Amp Site1 Site2 Dist (cm)   Left Median Anti Sensory (2nd Digit)  28.4°C   Wrist    3.4 <4 55.8 >13 Wrist 2nd Digit 14.0   Right Median Anti Sensory (2nd Digit)  28.2°C   Wrist    3.0 <4 53.3 >13 Wrist 2nd Digit 14.0   Left Radial Anti Sensory (Base 1st Digit)  29.3°C   Wrist    1.9 <2.8 78.1 >11 Wrist Base 1st Digit 10.0   Right Radial Anti Sensory (Base 1st Digit)  30.1°C   Wrist    2.0 <2.8 94.4 >11 Wrist Base 1st Digit 10.0   Left Ulnar Anti Sensory (5th Digit)  29°C   Wrist    3.1 <4.0 49.1 >9 Wrist 5th Digit 14.0   Right Ulnar Anti Sensory (5th Digit)  29.3°C   Wrist    2.8 <4.0 45.0 >9 Wrist 5th Digit 14.0     Motor Summary Table     Stim Site NR Onset (ms) Norm Onset (ms) O-P Amp (mV) Norm O-P Amp Amp (Prev) (%) Site1 Site2 Dist (cm) Bruno (m/s) Norm Bruno (m/s)   Left Median Motor (Abd Poll Brev)  29.5°C   Wrist    3.7 <4.5 11.5 >4.1 100.0 Wrist Abd Poll Brev 8.0     Elbow    6.9  9.5  82.6 Elbow Wrist 19.0 59 >49   Right Median Motor (Abd Poll Brev)  30.4°C   Wrist    3.0 <4.5 12.0 >4.1 100.0 Wrist Abd Poll Brev 8.0     Elbow    6.9  10.8  90.0 Elbow Wrist 21.0 54 >49   Left Ulnar Motor (Abd Dig Minimi)  29.8°C   Wrist    2.5 <3.1 10.4 >7.0 100.0 Wrist Abd Dig Minimi 8.0  >50   B Elbow    6.6  8.8  84.6 B Elbow Wrist 21.0 51 >50   A Elbow    8.2  8.0  90.9 A Elbow B Elbow 10.0 63 >50   Right Ulnar Motor (Abd Dig Minimi)  30°C   Wrist    2.5 <3.1 9.1 >7.0 100.0 Wrist Abd Dig Minimi 8.0  >50   B Elbow    5.9  7.3  80.2 B Elbow Wrist 19.0 56 >50   A Elbow    7.8  7.1  97.3 A Elbow B Elbow 10.0 53 >50     Comparison Summary Table     Stim Site NR Peak (ms) P-T Amp (µV) Site1 Site2 Dist (cm) Delta-0 (ms)   Left Median/Ulnar Palm Comparison (Wrist) 29.8°C   Median Palm    2.2 59.5 Median Palm Ulnar Palm 8.0 0.4   Ulnar Palm    1.8 22.9       Right Median/Ulnar Palm Comparison (Wrist)  29.9°C   Median Palm    1.8 41.1 Median Palm Ulnar Palm 8.0 0.5   Ulnar Palm    1.6 13.4         F Wave Studies     NR F-Lat (ms) Lat Norm (ms) L-R F-Lat (ms) L-R Lat Norm   Left Ulnar (Mrkrs) (Abd Dig Min)  30.1°C      26.12 <32 0.00 <2.5   Right Ulnar (Mrkrs) (Abd Dig Min)  29.8°C      26.12 <32 0.00 <2.5     EMG     Side Muscle Nerve Root Ins Act Fibs Psw Recrt Duration Amp Poly Comment   Left 1stDorInt Ulnar C8-T1 Nml Nml Nml Nml Nml Nml Nml    Left ExtIndicis Radial (Post Int) C7-8 Nml Nml Nml Nml Nml Nml Nml    Left Abd Poll Brev Median C8-T1 Nml Nml Nml Nml Nml Nml Nml    Left PronatorTeres Median C6-7 Nml Nml Nml Nml Nml Nml Nml    Left Biceps Musculocut C5-6 Nml Nml Nml Nml Nml Nml Nml    Left Triceps Radial C6-7-8 Nml Nml Nml Nml Nml Nml Nml    Left Deltoid Axillary C5-6 Nml Nml Nml Nml Nml Nml Nml    Right 1stDorInt Ulnar C8-T1 Nml Nml Nml Nml Nml Nml Nml    Right ExtIndicis Radial (Post Int) C7-8 Nml Nml Nml Nml Nml Nml Nml    Right Abd Poll Brev Median C8-T1 Nml Nml Nml Nml Nml Nml Nml    Right PronatorTeres Median C6-7 Nml Nml Nml Reduced Incr Nml 2+    Right Biceps Musculocut C5-6 Nml Nml Nml Reduced Incr Incr Nml CRDs   Right Triceps Radial C6-7-8 Nml Nml Nml Nml Nml Nml Nml    Right Deltoid Axillary C5-6 Nml Nml Nml Reduced Incr Incr Nml    Right Lower Cerv Parasp Rami C7,T1 Nml Nml Nml Nml Nml Nml Nml                Nerve Conduction Studies  Anti Sensory Left/Right Comparison     Stim Site L Lat (ms) R Lat (ms) L-R Lat (ms) L Amp (µV) R Amp (µV) L-R Amp (%) Site1 Site2 L Bruno (m/s) R Bruno (m/s) L-R Bruno (m/s)   Median Anti Sensory (2nd Digit)  28.4°C   Wrist 2.7 2.4 0.3 55.8 53.3 4.5 Wrist 2nd Digit 52 58 6   Radial Anti Sensory (Base 1st Digit)  29.3°C   Wrist 1.4 1.4 0.0 78.1 94.4 17.3 Wrist Base 1st Digit 71 71 0   Ulnar Anti Sensory (5th Digit)  29°C   Wrist 2.4 2.2 0.2 49.1 45.0 8.4 Wrist 5th Digit 58 64 6     Motor Left/Right Comparison     Stim Site L Lat (ms) R Lat (ms) L-R Lat (ms) L Amp (mV) R Amp (mV) L-R Amp (%) Site1 Site2 L Bruno (m/s) R Bruno (m/s) L-R Bruno (m/s)   Median Motor (Abd Poll Brev)  29.5°C   Wrist 3.7 3.0 0.7 11.5 12.0 4.2 Wrist Abd Poll Brev      Elbow 6.9 6.9 0.0 9.5 10.8 12.0 Elbow Wrist 59 54 5   Ulnar Motor (Abd Dig Minimi)  29.8°C   Wrist 2.5 2.5 0.0 10.4 9.1 12.5 Wrist Abd Dig Minimi      B Elbow 6.6 5.9 0.7 8.8 7.3 17.0 B Elbow Wrist 51 56 5   A Elbow 8.2 7.8 0.4 8.0 7.1 11.3 A Elbow B Elbow 63 53 10     Comparison Left/Right Comparison     Stim Site L Lat (ms) R Lat (ms) L-R Lat (ms) L Amp (µV) R Amp (µV) L-R Amp (%)   Median/Ulnar Palm Comparison (Wrist)  29.8°C   Median Palm 1.7 1.4 0.3 44.5 26.3 40.9   Ulnar Palm 1.3 0.9 0.4 13.3 9.9 25.6         Waveforms:

## 2020-08-07 NOTE — LETTER
2020 2:29 PM 
 
Patient:  Melisa YOB: 1955 Date of Visit: 2020 Dear Cody Anderson MD 
[address] [facility] [address] [place] [address] VIA Facsimile: [phone]: Thank you for referring Ms. Jamee Wheatley to me for EMG/NCS. EMG/ NCS Report [address] [place] [address], [address] [place] [address] Ph: [phone] FAX: [phone] Test Date:  2020 Patient: Storm Alvarado : 1955 Physician: Keturah Martines MD  
Sex: Female Height: ' \" Ref Phys: Shady House MD  
ID#: [id number] Weight:  lbs. Technician: Josh Mejia Patient History / Exam: 
Patient is coming for radiculopathy and neuropathy evaluation. Melisa is a 59 y.o. female who  has a past medical history of 2019 - mild myelomalacia C4C5 with spinal fusion with rods c/o ortho Dr Elmer Dean (Pain in the neck and L UE), borderline DM, Arthritis, GERD (gastroesophageal reflux disease), H/O degenerative disc disease, Hyperlipemia, Hypertension, Nausea & vomiting, Obesity (BMI 30-39.9) (09/10/2018), Sleep apnea, and Vertigo. who comes in with several concerns. 2 months ago, patient started episodes of painful muscle cramping of both hands lasting for 5 secs occurring 2/ day. Tried Flexeril with minimal benefit. (+) neck muscle spasms. Consideration includes related to history of cervical myelopathy s/p C5 fusion c/o Dr Elmer Dean.   
 
Exam: Patient awake, alert, follows commands, clear speech; hearing grossly intact; EOMI, (-) facial asymmetry, tongue midline; Motor: 5/5 in all extremities; Sensory: intact to LT, PP, JPS; DTRs +; Good FTN and HTS; Gait: stable EMG & NCV Findings: 
Evaluation of the left median motor and the right median motor nerves showed normal distal onset latency (L3.7, R3.0 ms), normal amplitude (L11.5, R12.0 mV), and normal conduction velocity (Elbow-Wrist, L59, R54 m/s).  The left ulnar motor and the right ulnar motor nerves showed normal distal onset latency (L2.5, R2.5 ms), normal amplitude (L10.4, R9.1 mV), normal conduction velocity (B Elbow-Wrist, L51, R56 m/s), and normal conduction velocity (A Elbow-B Elbow, L63, R53 m/s). The left median sensory, the right median sensory, the left radial sensory, the right radial sensory, the left ulnar sensory, and the right ulnar sensory nerves showed normal distal peak latency (L3.4, R3.0, L1.9, R2.0, L3.1, R2.8 ms) and normal amplitude (L55.8, R53.3, L78.1, R94.4, L49.1, R45.0 µV). The left median/ulnar (palm) comparison and the right median/ulnar (palm) comparison nerves showed normal distal onset latency (Median Palm, L1.7, R1.4 ms), normal distal peak latency (Median Palm, L2.2, R1.8 ms), normal amplitude (Median Palm, L44.5, R26.3 µV), normal distal onset latency (Ulnar Palm, L1.3, R0.9 ms), normal distal peak latency (Ulnar Palm, L1.8, R1.6 ms), and normal amplitude (Ulnar Palm, L13.3, R9.9 µV). All left vs. right side differences were within normal limits. All F Wave latencies were within normal limits. All F Wave left vs. right side latency differences were within normal limits. Needle evaluation of the right pronator teres muscle showed diminished recruitment, Incr Duration, and moderately increased polyphasic potentials. The right biceps and the right deltoid muscles showed diminished recruitment, Incr Duration, and increased motor unit amplitude. All remaining muscles (as indicated in the following table) showed no evidence of electrical instability. Impression: ABNORMAL Extensive electrodiagnostic examination of the right and left upper extremities, including palmar study, shows the followin) Evidence of a left median mononeuropathy at or distal to the wrist, consistent with a clinical diagnosis of carpal tunnel syndrome, minimal in degree electrically.   
 
2) Chronic, without active, motor axon loss changes in muscles innervated by right C5/C6 roots/segments suggestive of an underlying chronic, right C5/C6 motor radiculopathy. Recommend radiological correlation. Stacy Romero MD 
Diplomate, American Board of Psychiatry and Neurology Diplomate, Neuromuscular Medicine Diplomate, [address] [place] [address] Board of Electrodiagnostic Medicine Director, [address] [place] [address] Accredited Laboratory with Exemplary Status Nerve Conduction Studies Anti Sensory Summary Table Stim Site NR Peak (ms) Norm Peak (ms) P-T Amp (µV) Norm P-T Amp Site1 Site2 Dist (cm) Left Median Anti Sensory (2nd Digit)  28.4°C Wrist    3.4 <4 55.8 >13 Wrist 2nd Digit 14.0 Right Median Anti Sensory (2nd Digit)  28.2°C Wrist    3.0 <4 53.3 >13 Wrist 2nd Digit 14.0 Left Radial Anti Sensory (Base 1st Digit)  29.3°C Wrist    1.9 <2.8 78.1 >11 Wrist Base 1st Digit 10.0 Right Radial Anti Sensory (Base 1st Digit)  30.1°C Wrist    2.0 <2.8 94.4 >11 Wrist Base 1st Digit 10.0 Left Ulnar Anti Sensory (5th Digit)  29°C Wrist    3.1 <4.0 49.1 >9 Wrist 5th Digit 14.0 Right Ulnar Anti Sensory (5th Digit)  29.3°C Wrist    2.8 <4.0 45.0 >9 Wrist 5th Digit 14.0 Motor Summary Table Stim Site NR Onset (ms) Norm Onset (ms) O-P Amp (mV) Norm O-P Amp Amp (Prev) (%) Site1 Site2 Dist (cm) Bruno (m/s) Norm Bruno (m/s) Left Median Motor (Abd Poll Brev)  29.5°C Wrist    3.7 <4.5 11.5 >4.1 100.0 Wrist Abd Poll Brev 8.0 Elbow    6.9  9.5  82.6 Elbow Wrist 19.0 59 >49 Right Median Motor (Abd Poll Brev)  30.4°C Wrist    3.0 <4.5 12.0 >4.1 100.0 Wrist Abd Poll Brev 8.0 Elbow    6.9  10.8  90.0 Elbow Wrist 21.0 54 >49 Left Ulnar Motor (Abd Dig Minimi)  29.8°C Wrist    2.5 <3.1 10.4 >7.0 100.0 Wrist Abd Dig Minimi 8.0  >50 B Elbow    6.6  8.8  84.6 B Elbow Wrist 21.0 51 >50 A Elbow    8.2  8.0  90.9 A Elbow B Elbow 10.0 63 >50 Right Ulnar Motor (Abd Dig Minimi)  30°C Wrist    2.5 <3.1 9.1 >7.0 100.0 Wrist Abd Dig Minimi 8.0  >50 B Elbow    5.9  7.3  80.2 B Elbow Wrist 19.0 56 >50 A Elbow    7.8  7.1  97.3 A Elbow B Elbow 10.0 53 >50 Comparison Summary Table Stim Site NR Peak (ms) P-T Amp (µV) Site1 Site2 Dist (cm) Delta-0 (ms) Left Median/Ulnar Palm Comparison (Wrist)  29.8°C Median Palm    2.2 59.5 Median Palm Ulnar Palm 8.0 0.4 Ulnar Palm    1.8 22.9 Right Median/Ulnar Palm Comparison (Wrist)  29.9°C Median Palm    1.8 41.1 Median Palm Ulnar Palm 8.0 0.5 Ulnar Palm    1.6 13.4 F Wave Studies NR F-Lat (ms) Lat Norm (ms) L-R F-Lat (ms) L-R Lat Norm Left Ulnar (Mrkrs) (Abd Dig Min)  30.1°C  
   26.12 <32 0.00 <2.5 Right Ulnar (Mrkrs) (Abd Dig Min)  29.8°C  
   26.12 <32 0.00 <2.5 EMG Side Muscle Nerve Root Ins Act Fibs Psw Recrt Duration Amp Poly Comment Left 1stDorInt Ulnar C8-T1 Nml Nml Nml Nml Nml Nml Nml Left ExtIndicis Radial (Post Int) C7-8 Nml Nml Nml Nml Nml Nml Nml Left Abd Poll Brev Median C8-T1 Nml Nml Nml Nml Nml Nml Nml Left PronatorTeres Median C6-7 Nml Nml Nml Nml Nml Nml Nml Left Biceps Musculocut C5-6 Nml Nml Nml Nml Nml Nml Nml Left Triceps Radial C6-7-8 Nml Nml Nml Nml Nml Nml Nml Left Deltoid Axillary C5-6 Nml Nml Nml Nml Nml Nml Nml Right 1stDorInt Ulnar C8-T1 Nml Nml Nml Nml Nml Nml Nml Right ExtIndicis Radial (Post Int) C7-8 Nml Nml Nml Nml Nml Nml Nml Right Abd Poll Brev Median C8-T1 Nml Nml Nml Nml Nml Nml Nml Right PronatorTeres Median C6-7 Nml Nml Nml Reduced Incr Nml 2+ Right Biceps Musculocut C5-6 Nml Nml Nml Reduced Incr Incr Nml CRDs Right Triceps Radial C6-7-8 Nml Nml Nml Nml Nml Nml Nml Right Deltoid Axillary C5-6 Nml Nml Nml Reduced Incr Incr Nml Right Lower Cerv Parasp Rami C7,T1 Nml Nml Nml Nml Nml Nml Nml Nerve Conduction Studies Anti Sensory Left/Right Comparison  Stim Site L Lat (ms) R Lat (ms) L-R Lat (ms) L Amp (µV) R Amp (µV) L-R Amp (%) Site1 Site2 L Bruno (m/s) R Bruno (m/s) L-R Bruno (m/s) Median Anti Sensory (2nd Digit)  28.4°C Wrist 2.7 2.4 0.3 55.8 53.3 4.5 Wrist 2nd Digit 52 58 6 Radial Anti Sensory (Base 1st Digit)  29.3°C Wrist 1.4 1.4 0.0 78.1 94.4 17.3 Wrist Base 1st Digit 71 71 0 Ulnar Anti Sensory (5th Digit)  29°C Wrist 2.4 2.2 0.2 49.1 45.0 8.4 Wrist 5th Digit 58 64 6 Motor Left/Right Comparison Stim Site L Lat (ms) R Lat (ms) L-R Lat (ms) L Amp (mV) R Amp (mV) L-R Amp (%) Site1 Site2 L Bruno (m/s) R Bruno (m/s) L-R Bruno (m/s) Median Motor (Abd Poll Brev)  29.5°C Wrist 3.7 3.0 0.7 11.5 12.0 4.2 Wrist Abd Poll Brev     
Elbow 6.9 6.9 0.0 9.5 10.8 12.0 Elbow Wrist 59 54 5 Ulnar Motor (Abd Dig Minimi)  29.8°C Wrist 2.5 2.5 0.0 10.4 9.1 12.5 Wrist Abd Dig Minimi B Elbow 6.6 5.9 0.7 8.8 7.3 17.0 B Elbow Wrist 51 56 5 A Elbow 8.2 7.8 0.4 8.0 7.1 11.3 A Elbow B Elbow 63 53 10 Comparison Left/Right Comparison Stim Site L Lat (ms) R Lat (ms) L-R Lat (ms) L Amp (µV) R Amp (µV) L-R Amp (%) Median/Ulnar Palm Comparison (Wrist)  29.8°C Median Palm 1.7 1.4 0.3 44.5 26.3 40.9 Ulnar Palm 1.3 0.9 0.4 13.3 9.9 25.6 Waveforms:

## 2020-08-07 NOTE — PROGRESS NOTES
EMG/NCS done. See Procedure Note for results. Discussed EMG/NCS of both UE showing L CTS CTS, minimal in degree electrically. Also showing chronic R C5 motor radiculopathy. MRI Brain negative for acute sroke. Insurance did not approve MRI cervical spine    A> Possible TIA vs medication side effect (I.e. Tizanidine)  Painful muscle cramping of both hands, possibly due to L CTS  R C5 motor radiculopathy s/p Nov 2019 - mild myelomalacia C4C5 with spinal fusion with rods c/o ortho Dr Jhoana Barnett (Pain in the neck and L UE)    P> 1) Patient already on ASA  2) Advise bilateral wrist splint every night  3) Reviewed MRI brain with patient  4) Will consider repeating MRI cervical spine if still no improvement despite above    Follow up depending on above.       Jaylene Bullock MD

## 2020-11-25 ENCOUNTER — HOSPITAL ENCOUNTER (OUTPATIENT)
Dept: PREADMISSION TESTING | Age: 65
Discharge: HOME | End: 2020-11-25
Payer: MEDICARE

## 2020-11-25 LAB — SARS-COV-2, COV2: NORMAL

## 2020-11-25 PROCEDURE — 87635 SARS-COV-2 COVID-19 AMP PRB: CPT

## 2020-11-27 LAB — SARS-COV-2, COV2NT: NOT DETECTED

## 2020-11-30 ENCOUNTER — ANESTHESIA EVENT (OUTPATIENT)
Dept: ENDOSCOPY | Age: 65
End: 2020-11-30
Payer: MEDICARE

## 2020-11-30 ENCOUNTER — ANESTHESIA (OUTPATIENT)
Dept: ENDOSCOPY | Age: 65
End: 2020-11-30
Payer: MEDICARE

## 2020-11-30 ENCOUNTER — APPOINTMENT (OUTPATIENT)
Dept: ENDOSCOPY | Age: 65
End: 2020-11-30
Attending: INTERNAL MEDICINE
Payer: MEDICARE

## 2020-11-30 ENCOUNTER — HOSPITAL ENCOUNTER (OUTPATIENT)
Age: 65
Setting detail: OUTPATIENT SURGERY
Discharge: HOME | End: 2020-11-30
Attending: INTERNAL MEDICINE | Admitting: INTERNAL MEDICINE
Payer: MEDICARE

## 2020-11-30 VITALS
OXYGEN SATURATION: 98 % | TEMPERATURE: 98.1 F | BODY MASS INDEX: 44.39 KG/M2 | HEART RATE: 65 BPM | WEIGHT: 260 LBS | RESPIRATION RATE: 18 BRPM | HEIGHT: 64 IN | DIASTOLIC BLOOD PRESSURE: 82 MMHG | SYSTOLIC BLOOD PRESSURE: 143 MMHG

## 2020-11-30 PROCEDURE — 74011250636 HC RX REV CODE- 250/636: Performed by: INTERNAL MEDICINE

## 2020-11-30 PROCEDURE — 76040000007: Performed by: INTERNAL MEDICINE

## 2020-11-30 PROCEDURE — 74011250636 HC RX REV CODE- 250/636: Performed by: NURSE ANESTHETIST, CERTIFIED REGISTERED

## 2020-11-30 PROCEDURE — 76060000032 HC ANESTHESIA 0.5 TO 1 HR: Performed by: INTERNAL MEDICINE

## 2020-11-30 PROCEDURE — 88305 TISSUE EXAM BY PATHOLOGIST: CPT

## 2020-11-30 PROCEDURE — 77030019988 HC FCPS ENDOSC DISP BSC -B: Performed by: INTERNAL MEDICINE

## 2020-11-30 PROCEDURE — 2709999900 HC NON-CHARGEABLE SUPPLY: Performed by: INTERNAL MEDICINE

## 2020-11-30 PROCEDURE — 74011000250 HC RX REV CODE- 250: Performed by: NURSE ANESTHETIST, CERTIFIED REGISTERED

## 2020-11-30 RX ORDER — PROPOFOL 10 MG/ML
INJECTION, EMULSION INTRAVENOUS AS NEEDED
Status: DISCONTINUED | OUTPATIENT
Start: 2020-11-30 | End: 2020-11-30 | Stop reason: HOSPADM

## 2020-11-30 RX ORDER — METOPROLOL TARTRATE 5 MG/5ML
2.5 INJECTION INTRAVENOUS
Status: CANCELLED | OUTPATIENT
Start: 2020-11-30

## 2020-11-30 RX ORDER — LIDOCAINE HYDROCHLORIDE 20 MG/ML
INJECTION, SOLUTION EPIDURAL; INFILTRATION; INTRACAUDAL; PERINEURAL AS NEEDED
Status: DISCONTINUED | OUTPATIENT
Start: 2020-11-30 | End: 2020-11-30 | Stop reason: HOSPADM

## 2020-11-30 RX ORDER — GUAIFENESIN 100 MG/5ML
81 LIQUID (ML) ORAL DAILY
COMMUNITY

## 2020-11-30 RX ORDER — HYDRALAZINE HYDROCHLORIDE 20 MG/ML
10 INJECTION INTRAMUSCULAR; INTRAVENOUS
Status: CANCELLED | OUTPATIENT
Start: 2020-11-30

## 2020-11-30 RX ORDER — SODIUM CHLORIDE 9 MG/ML
50 INJECTION, SOLUTION INTRAVENOUS CONTINUOUS
Status: DISCONTINUED | OUTPATIENT
Start: 2020-11-30 | End: 2020-11-30 | Stop reason: HOSPADM

## 2020-11-30 RX ORDER — SODIUM CHLORIDE, SODIUM LACTATE, POTASSIUM CHLORIDE, CALCIUM CHLORIDE 600; 310; 30; 20 MG/100ML; MG/100ML; MG/100ML; MG/100ML
75 INJECTION, SOLUTION INTRAVENOUS CONTINUOUS
Status: DISCONTINUED | OUTPATIENT
Start: 2020-11-30 | End: 2020-11-30 | Stop reason: HOSPADM

## 2020-11-30 RX ORDER — LABETALOL HCL 20 MG/4 ML
5 SYRINGE (ML) INTRAVENOUS
Status: CANCELLED | OUTPATIENT
Start: 2020-11-30

## 2020-11-30 RX ADMIN — LIDOCAINE HYDROCHLORIDE 40 MG: 20 INJECTION, SOLUTION EPIDURAL; INFILTRATION; INTRACAUDAL; PERINEURAL at 10:08

## 2020-11-30 RX ADMIN — PROPOFOL 50 MG: 10 INJECTION, EMULSION INTRAVENOUS at 10:15

## 2020-11-30 RX ADMIN — PROPOFOL 50 MG: 10 INJECTION, EMULSION INTRAVENOUS at 10:22

## 2020-11-30 RX ADMIN — PROPOFOL 50 MG: 10 INJECTION, EMULSION INTRAVENOUS at 10:17

## 2020-11-30 RX ADMIN — PROPOFOL 50 MG: 10 INJECTION, EMULSION INTRAVENOUS at 10:13

## 2020-11-30 RX ADMIN — PROPOFOL 50 MG: 10 INJECTION, EMULSION INTRAVENOUS at 10:08

## 2020-11-30 RX ADMIN — SODIUM CHLORIDE, POTASSIUM CHLORIDE, SODIUM LACTATE AND CALCIUM CHLORIDE 75 ML/HR: 600; 310; 30; 20 INJECTION, SOLUTION INTRAVENOUS at 09:25

## 2020-11-30 RX ADMIN — LIDOCAINE HYDROCHLORIDE 60 MG: 20 INJECTION, SOLUTION EPIDURAL; INFILTRATION; INTRACAUDAL; PERINEURAL at 10:06

## 2020-11-30 RX ADMIN — PROPOFOL 50 MG: 10 INJECTION, EMULSION INTRAVENOUS at 10:14

## 2020-11-30 RX ADMIN — PROPOFOL 50 MG: 10 INJECTION, EMULSION INTRAVENOUS at 10:07

## 2020-11-30 NOTE — ANESTHESIA PREPROCEDURE EVALUATION
Anesthetic History   No history of anesthetic complications  PONV          Review of Systems / Medical History  Patient summary reviewed, nursing notes reviewed and pertinent labs reviewed    Pulmonary  Within defined limits      Sleep apnea           Neuro/Psych         TIA     Cardiovascular    Hypertension          Hyperlipidemia         GI/Hepatic/Renal  Within defined limits   GERD: well controlled           Endo/Other  Within defined limits      Morbid obesity and arthritis     Other Findings            Past Medical History:   Diagnosis Date    Arthritis     GERD (gastroesophageal reflux disease)     H/O degenerative disc disease     Hyperlipemia     Hypertension     Nausea & vomiting     Obesity (BMI 30-39.9) 09/10/2018    BMI= 36    Sleep apnea     CPAP- has not used since weight loss    Vertigo        Past Surgical History:   Procedure Laterality Date    ABDOMEN SURGERY PROC UNLISTED  1989    Abdominal wall surgery after childbirth    HX CERVICAL FUSION  09/18/2018    C4-C7    HX COLONOSCOPY      HX HEART CATHETERIZATION  2014    Patient states no blockages found    HX HYSTERECTOMY  1999    HX ORTHOPAEDIC Left 2014    hip replacement       Current Outpatient Medications   Medication Instructions    benazepriL (LOTENSIN) 10 mg, Oral, DAILY    cyclobenzaprine (FLEXERIL) 10 mg, Oral, 3 TIMES DAILY AS NEEDED    gabapentin (NEURONTIN) 300 mg capsule Take 1 capsule PO in the morning and afternoon, then take 3 capsules PO at bedtime    naloxone (NARCAN) 4 mg/actuation nasal spray 1 Spray, IntraNASal, AS NEEDED, Apply 1 nasal spray to one nostril; may repeat every 2 to 3 minutes in alternating nostrils until medical assistance becomes available    oxyCODONE IR (OXY-IR) 15 mg, Oral, EVERY 4 HOURS AS NEEDED    pantoprazole (PROTONIX) 40 mg, Oral, DAILY    pravastatin (PRAVACHOL) 40 mg, Oral, EVERY BEDTIME    tiZANidine (ZANAFLEX) 2 mg capsule EVERY 8 HOURS    tiZANidine (ZANAFLEX) 4 mg, Oral, 3 TIMES DAILY AS NEEDED       Current Facility-Administered Medications   Medication Dose Route Frequency    0.9% sodium chloride infusion  50 mL/hr IntraVENous CONTINUOUS    lactated Ringers infusion  75 mL/hr IntraVENous CONTINUOUS       No data found. /69  SpO2 100%  Temp 98.9F  RR 18  Lab Results   Component Value Date/Time    WBC 6.4 09/10/2018 10:08 AM    HGB 11.6 09/24/2018 04:08 AM    HCT 44.9 09/10/2018 10:08 AM    PLATELET 748 68/27/0018 10:08 AM    MCV 83.6 09/10/2018 10:08 AM     Lab Results   Component Value Date/Time    Sodium 143 09/24/2018 04:08 AM    Potassium 4.3 09/24/2018 04:08 AM    Chloride 107 09/24/2018 04:08 AM    CO2 29 09/24/2018 04:08 AM    Anion gap 7 09/24/2018 04:08 AM    Glucose 131 (H) 09/24/2018 04:08 AM    BUN 12 09/24/2018 04:08 AM    Creatinine 0.76 09/24/2018 04:08 AM    BUN/Creatinine ratio 16 09/24/2018 04:08 AM    GFR est AA >60 09/24/2018 04:08 AM    GFR est non-AA >60 09/24/2018 04:08 AM    Calcium 9.1 09/24/2018 04:08 AM     No results found for: APTT, PTP, INR, INREXT, INREXT  Lab Results   Component Value Date/Time    Glucose 131 (H) 09/24/2018 04:08 AM     Physical Exam    Airway  Mallampati: II  TM Distance: 4 - 6 cm  Neck ROM: normal range of motion   Mouth opening: Normal     Cardiovascular    Rhythm: regular  Rate: normal         Dental         Pulmonary  Breath sounds clear to auscultation               Abdominal         Other Findings            Anesthetic Plan    ASA: 3  Anesthesia type: total IV anesthesia and general          Induction: Intravenous  Anesthetic plan and risks discussed with: Patient and Family      General anesthesia was prescribed for this patient because by definition it is \"a drug-induced loss of consciousness during which patients are not arousable, even by painful stimulation. \" Sometimes, the ability to independently maintain ventilatory function is often impaired and patients often require assistance in maintaining a patent airway. Occasionally, positive pressure ventilation may be required because of depressed spontaneous ventilation or drug-induced depression of neuromuscular function. This depth of anesthesia is preferred for endoscopic procedures to facilitate the procedure and for patient safety/quality of care.

## 2020-11-30 NOTE — PROGRESS NOTES
Face to face report given to [facility]. Updated nurse pt relays \"I am supposed to have an upper endoscopy too\". Nurse to verify with Dr. Marvin Storm. Pt to endo in stable condition via stretcher.

## 2020-11-30 NOTE — ANESTHESIA POSTPROCEDURE EVALUATION
Procedure(s):  COLON BIOPSY  ESOPHAGOGASTRODUODENOSCOPY (EGD).     total IV anesthesia, general    Anesthesia Post Evaluation      Multimodal analgesia: multimodal analgesia not used between 6 hours prior to anesthesia start to PACU discharge  Patient location during evaluation: bedside  Level of consciousness: sleepy but conscious  Pain score: 0  Airway patency: patent  Anesthetic complications: no  Cardiovascular status: acceptable  Respiratory status: acceptable  Hydration status: acceptable  Post anesthesia nausea and vomiting:  none      INITIAL Post-op Vital signs:   Vitals Value Taken Time   /88 11/30/2020 10:27 AM   Temp     Pulse 82 11/30/2020 10:27 AM   Resp 83 11/30/2020 10:27 AM   SpO2 100 % 11/30/2020 10:27 AM

## 2021-02-09 ENCOUNTER — TRANSCRIBE ORDER (OUTPATIENT)
Dept: SCHEDULING | Age: 66
End: 2021-02-09

## 2021-02-09 DIAGNOSIS — Z12.31 VISIT FOR SCREENING MAMMOGRAM: Primary | ICD-10-CM

## 2021-03-22 ENCOUNTER — HOSPITAL ENCOUNTER (OUTPATIENT)
Dept: MAMMOGRAPHY | Age: 66
Discharge: HOME | End: 2021-03-22
Attending: FAMILY MEDICINE
Payer: MEDICARE

## 2021-03-22 DIAGNOSIS — Z12.31 VISIT FOR SCREENING MAMMOGRAM: ICD-10-CM

## 2021-03-22 PROCEDURE — 77063 BREAST TOMOSYNTHESIS BI: CPT

## 2021-05-27 ENCOUNTER — TRANSCRIBE ORDER (OUTPATIENT)
Dept: SCHEDULING | Age: 66
End: 2021-05-27

## 2021-05-27 DIAGNOSIS — R55 SYNCOPE: Primary | ICD-10-CM

## 2022-03-11 ENCOUNTER — TRANSCRIBE ORDER (OUTPATIENT)
Dept: SCHEDULING | Age: 67
End: 2022-03-11

## 2022-03-11 DIAGNOSIS — Z12.31 SCREENING MAMMOGRAM FOR HIGH-RISK PATIENT: Primary | ICD-10-CM

## 2022-03-18 PROBLEM — E66.01 SEVERE OBESITY (HCC): Status: ACTIVE | Noted: 2020-08-05

## 2022-03-20 PROBLEM — M48.02 CERVICAL STENOSIS OF SPINAL CANAL: Status: ACTIVE | Noted: 2018-09-18

## 2022-03-28 ENCOUNTER — HOSPITAL ENCOUNTER (OUTPATIENT)
Dept: MAMMOGRAPHY | Age: 67
Discharge: HOME | End: 2022-03-28
Attending: FAMILY MEDICINE

## 2022-03-28 DIAGNOSIS — Z12.31 SCREENING MAMMOGRAM FOR HIGH-RISK PATIENT: ICD-10-CM

## 2022-05-18 ENCOUNTER — TRANSCRIBE ORDER (OUTPATIENT)
Dept: SCHEDULING | Age: 67
End: 2022-05-18

## 2022-05-18 DIAGNOSIS — N63.21 MASS OF UPPER OUTER QUADRANT OF LEFT BREAST: Primary | ICD-10-CM

## 2022-05-19 ENCOUNTER — TRANSCRIBE ORDER (OUTPATIENT)
Dept: SCHEDULING | Age: 67
End: 2022-05-19

## 2022-05-19 DIAGNOSIS — N63.21 MASS OF UPPER OUTER QUADRANT OF LEFT BREAST: Primary | ICD-10-CM

## 2022-05-23 ENCOUNTER — TRANSCRIBE ORDER (OUTPATIENT)
Dept: SCHEDULING | Age: 67
End: 2022-05-23

## 2022-05-23 DIAGNOSIS — N63.21 MASS OF UPPER OUTER QUADRANT OF LEFT BREAST: Primary | ICD-10-CM

## 2022-05-24 ENCOUNTER — TRANSCRIBE ORDER (OUTPATIENT)
Dept: SCHEDULING | Age: 67
End: 2022-05-24

## 2022-05-24 DIAGNOSIS — N63.21 MASS OF UPPER OUTER QUADRANT OF LEFT BREAST: Primary | ICD-10-CM

## 2022-05-25 ENCOUNTER — TRANSCRIBE ORDER (OUTPATIENT)
Dept: SCHEDULING | Age: 67
End: 2022-05-25

## 2022-05-25 DIAGNOSIS — R92.8 ABNORMAL MAMMOGRAM: Primary | ICD-10-CM

## 2022-06-21 ENCOUNTER — HOSPITAL ENCOUNTER (OUTPATIENT)
Dept: MAMMOGRAPHY | Age: 67
Discharge: HOME | End: 2022-06-21
Attending: FAMILY MEDICINE
Payer: MEDICARE

## 2022-06-21 DIAGNOSIS — R92.8 ABNORMAL MAMMOGRAM: ICD-10-CM

## 2022-06-21 DIAGNOSIS — N63.21 MASS OF UPPER OUTER QUADRANT OF LEFT BREAST: ICD-10-CM

## 2022-06-21 PROCEDURE — 77062 BREAST TOMOSYNTHESIS BI: CPT

## 2022-06-21 PROCEDURE — 76642 ULTRASOUND BREAST LIMITED: CPT

## 2023-05-03 ENCOUNTER — HOSPITAL ENCOUNTER (EMERGENCY)
Age: 68
Discharge: HOME | End: 2023-05-03
Attending: STUDENT IN AN ORGANIZED HEALTH CARE EDUCATION/TRAINING PROGRAM
Payer: MEDICARE

## 2023-05-03 ENCOUNTER — TRANSCRIBE ORDER (OUTPATIENT)
Dept: REGISTRATION | Age: 68
End: 2023-05-03

## 2023-05-03 ENCOUNTER — APPOINTMENT (OUTPATIENT)
Dept: GENERAL RADIOLOGY | Age: 68
End: 2023-05-03
Attending: STUDENT IN AN ORGANIZED HEALTH CARE EDUCATION/TRAINING PROGRAM
Payer: MEDICARE

## 2023-05-03 ENCOUNTER — HOSPITAL ENCOUNTER (EMERGENCY)
Dept: NON INVASIVE DIAGNOSTICS | Age: 68
End: 2023-05-03
Payer: MEDICARE

## 2023-05-03 VITALS
DIASTOLIC BLOOD PRESSURE: 65 MMHG | HEIGHT: 64 IN | TEMPERATURE: 98.2 F | SYSTOLIC BLOOD PRESSURE: 126 MMHG | RESPIRATION RATE: 19 BRPM | WEIGHT: 217 LBS | HEART RATE: 101 BPM | BODY MASS INDEX: 37.05 KG/M2 | OXYGEN SATURATION: 97 %

## 2023-05-03 DIAGNOSIS — M79.609 LIMB PAIN: Primary | ICD-10-CM

## 2023-05-03 DIAGNOSIS — M79.609 LIMB PAIN: ICD-10-CM

## 2023-05-03 DIAGNOSIS — M16.10 HIP ARTHRITIS: Primary | ICD-10-CM

## 2023-05-03 DIAGNOSIS — R60.9 SWELLING: ICD-10-CM

## 2023-05-03 PROCEDURE — 93971 EXTREMITY STUDY: CPT

## 2023-05-03 PROCEDURE — 73502 X-RAY EXAM HIP UNI 2-3 VIEWS: CPT

## 2023-05-03 PROCEDURE — 99283 EMERGENCY DEPT VISIT LOW MDM: CPT

## 2023-08-22 ENCOUNTER — HOSPITAL ENCOUNTER (OUTPATIENT)
Facility: HOSPITAL | Age: 68
Discharge: HOME | End: 2023-08-25
Attending: FAMILY MEDICINE
Payer: MEDICARE

## 2023-08-22 VITALS — WEIGHT: 217 LBS | BODY MASS INDEX: 37.05 KG/M2 | HEIGHT: 64 IN

## 2023-08-22 DIAGNOSIS — Z12.31 VISIT FOR SCREENING MAMMOGRAM: ICD-10-CM

## 2023-08-22 PROCEDURE — 77063 BREAST TOMOSYNTHESIS BI: CPT

## 2023-12-29 ENCOUNTER — HOSPITAL ENCOUNTER (INPATIENT)
Facility: HOSPITAL | Age: 68
LOS: 5 days | Discharge: HOME | DRG: 871 | End: 2024-01-03
Attending: STUDENT IN AN ORGANIZED HEALTH CARE EDUCATION/TRAINING PROGRAM | Admitting: HOSPITALIST
Payer: MEDICARE

## 2023-12-29 ENCOUNTER — APPOINTMENT (OUTPATIENT)
Facility: HOSPITAL | Age: 68
DRG: 871 | End: 2023-12-29
Payer: MEDICARE

## 2023-12-29 DIAGNOSIS — U07.1 COVID-19: Primary | ICD-10-CM

## 2023-12-29 LAB
ALBUMIN SERPL-MCNC: 4.1 G/DL (ref 3.5–5)
ALBUMIN/GLOB SERPL: 1.1 (ref 1.1–2.2)
ALP SERPL-CCNC: 124 U/L (ref 45–117)
ALT SERPL-CCNC: 87 U/L (ref 12–78)
ANION GAP SERPL CALC-SCNC: 7 MMOL/L (ref 5–15)
APPEARANCE UR: ABNORMAL
AST SERPL W P-5'-P-CCNC: 156 U/L (ref 15–37)
BACTERIA URNS QL MICRO: NEGATIVE /HPF
BASE DEFICIT BLD-SCNC: 0.9 MMOL/L
BASE DEFICIT BLD-SCNC: 1.9 MMOL/L
BASOPHILS # BLD: 0 K/UL (ref 0–0.1)
BASOPHILS NFR BLD: 0 % (ref 0–1)
BILIRUB SERPL-MCNC: 0.6 MG/DL (ref 0.2–1)
BILIRUB UR QL: NEGATIVE
BNP SERPL-MCNC: 93 PG/ML
BUN SERPL-MCNC: 8 MG/DL (ref 6–20)
BUN/CREAT SERPL: 9 (ref 12–20)
CA-I BLD-MCNC: 1.16 MMOL/L (ref 1.12–1.32)
CA-I BLD-MCNC: 1.2 MMOL/L (ref 1.12–1.32)
CA-I BLD-MCNC: 9.6 MG/DL (ref 8.5–10.1)
CHLORIDE BLD-SCNC: 108 MMOL/L (ref 98–107)
CHLORIDE BLD-SCNC: 112 MMOL/L (ref 98–107)
CHLORIDE SERPL-SCNC: 109 MMOL/L (ref 97–108)
CO2 BLD-SCNC: 24 MMOL/L
CO2 BLD-SCNC: 24 MMOL/L
CO2 SERPL-SCNC: 25 MMOL/L (ref 21–32)
COLOR UR: ABNORMAL
CREAT SERPL-MCNC: 0.89 MG/DL (ref 0.55–1.02)
CREAT UR-MCNC: 0.58 MG/DL (ref 0.6–1.3)
CREAT UR-MCNC: 0.72 MG/DL (ref 0.6–1.3)
CRP SERPL-MCNC: 6.45 MG/DL (ref 0–0.6)
DIFFERENTIAL METHOD BLD: ABNORMAL
EKG ATRIAL RATE: 101 BPM
EKG DIAGNOSIS: NORMAL
EKG P AXIS: 51 DEGREES
EKG P-R INTERVAL: 154 MS
EKG Q-T INTERVAL: 342 MS
EKG QRS DURATION: 82 MS
EKG QTC CALCULATION (BAZETT): 443 MS
EKG R AXIS: 60 DEGREES
EKG T AXIS: 7 DEGREES
EKG VENTRICULAR RATE: 101 BPM
EOSINOPHIL # BLD: 0.1 K/UL (ref 0–0.4)
EOSINOPHIL NFR BLD: 1 % (ref 0–7)
EPITH CASTS URNS QL MICRO: ABNORMAL /LPF
ERYTHROCYTE [DISTWIDTH] IN BLOOD BY AUTOMATED COUNT: 16 % (ref 11.5–14.5)
FLUAV AG NPH QL IA: NEGATIVE
FLUBV AG NOSE QL IA: NEGATIVE
GLOBULIN SER CALC-MCNC: 3.7 G/DL (ref 2–4)
GLUCOSE BLD STRIP.AUTO-MCNC: 127 MG/DL (ref 65–100)
GLUCOSE BLD STRIP.AUTO-MCNC: 158 MG/DL (ref 65–100)
GLUCOSE SERPL-MCNC: 158 MG/DL (ref 65–100)
GLUCOSE UR STRIP.AUTO-MCNC: NEGATIVE MG/DL
HCO3 BLD-SCNC: 23.9 MMOL/L (ref 19–28)
HCO3 BLD-SCNC: 24.8 MMOL/L (ref 19–28)
HCT VFR BLD AUTO: 41.6 % (ref 35–47)
HGB BLD-MCNC: 12.9 G/DL (ref 11.5–16)
HGB UR QL STRIP: NEGATIVE
IMM GRANULOCYTES # BLD AUTO: 0 K/UL (ref 0–0.04)
IMM GRANULOCYTES NFR BLD AUTO: 0 % (ref 0–0.5)
KETONES UR QL STRIP.AUTO: NEGATIVE MG/DL
LACTATE BLD-SCNC: 1.81 MMOL/L (ref 0.4–2)
LACTATE BLD-SCNC: 2.48 MMOL/L (ref 0.4–2)
LEUKOCYTE ESTERASE UR QL STRIP.AUTO: NEGATIVE
LYMPHOCYTES # BLD: 0.7 K/UL (ref 0.8–3.5)
LYMPHOCYTES NFR BLD: 11 % (ref 12–49)
MCH RBC QN AUTO: 24.8 PG (ref 26–34)
MCHC RBC AUTO-ENTMCNC: 31 G/DL (ref 30–36.5)
MCV RBC AUTO: 80 FL (ref 80–99)
MONOCYTES # BLD: 0.6 K/UL (ref 0–1)
MONOCYTES NFR BLD: 9 % (ref 5–13)
MUCOUS THREADS URNS QL MICRO: ABNORMAL /LPF
NEUTS SEG # BLD: 5.4 K/UL (ref 1.8–8)
NEUTS SEG NFR BLD: 79 % (ref 32–75)
NITRITE UR QL STRIP.AUTO: NEGATIVE
NRBC # BLD: 0 K/UL (ref 0–0.01)
NRBC BLD-RTO: 0 PER 100 WBC
PCO2 BLD: 43.5 MMHG (ref 35–45)
PCO2 BLD: 43.8 MMHG (ref 35–45)
PERFORMED BY:: ABNORMAL
PERFORMED BY:: ABNORMAL
PH BLD: 7.35 (ref 7.35–7.45)
PH BLD: 7.36 (ref 7.35–7.45)
PH UR STRIP: 5 (ref 5–8)
PLATELET # BLD AUTO: 289 K/UL (ref 150–400)
PMV BLD AUTO: 9.3 FL (ref 8.9–12.9)
PO2 BLD: 32 MMHG (ref 75–100)
PO2 BLD: 34 MMHG (ref 75–100)
POTASSIUM BLD-SCNC: 4.2 MMOL/L (ref 3.5–5.5)
POTASSIUM BLD-SCNC: 4.4 MMOL/L (ref 3.5–5.5)
POTASSIUM SERPL-SCNC: 3.8 MMOL/L (ref 3.5–5.1)
PROCALCITONIN SERPL-MCNC: 22.06 NG/ML
PROT SERPL-MCNC: 7.8 G/DL (ref 6.4–8.2)
PROT UR STRIP-MCNC: NEGATIVE MG/DL
RBC # BLD AUTO: 5.2 M/UL (ref 3.8–5.2)
RBC #/AREA URNS HPF: ABNORMAL /HPF (ref 0–5)
SAO2 % BLD: 60 %
SAO2 % BLD: 62 %
SARS-COV-2 RDRP RESP QL NAA+PROBE: DETECTED
SERVICE CMNT-IMP: ABNORMAL
SODIUM BLD-SCNC: 142 MMOL/L (ref 136–145)
SODIUM BLD-SCNC: 142 MMOL/L (ref 136–145)
SODIUM SERPL-SCNC: 141 MMOL/L (ref 136–145)
SP GR UR REFRACTOMETRY: 1.01 (ref 1–1.03)
SPECIMEN SITE: ABNORMAL
SPECIMEN SITE: ABNORMAL
TROPONIN I SERPL HS-MCNC: 4 NG/L (ref 0–51)
URINE CULTURE IF INDICATED: ABNORMAL
UROBILINOGEN UR QL STRIP.AUTO: 2 EU/DL (ref 0.1–1)
WBC # BLD AUTO: 6.8 K/UL (ref 3.6–11)
WBC URNS QL MICRO: ABNORMAL /HPF (ref 0–4)

## 2023-12-29 PROCEDURE — 87635 SARS-COV-2 COVID-19 AMP PRB: CPT

## 2023-12-29 PROCEDURE — 82947 ASSAY GLUCOSE BLOOD QUANT: CPT

## 2023-12-29 PROCEDURE — 84145 PROCALCITONIN (PCT): CPT

## 2023-12-29 PROCEDURE — 6360000002 HC RX W HCPCS: Performed by: STUDENT IN AN ORGANIZED HEALTH CARE EDUCATION/TRAINING PROGRAM

## 2023-12-29 PROCEDURE — 2580000003 HC RX 258: Performed by: STUDENT IN AN ORGANIZED HEALTH CARE EDUCATION/TRAINING PROGRAM

## 2023-12-29 PROCEDURE — 96375 TX/PRO/DX INJ NEW DRUG ADDON: CPT

## 2023-12-29 PROCEDURE — 6370000000 HC RX 637 (ALT 250 FOR IP): Performed by: STUDENT IN AN ORGANIZED HEALTH CARE EDUCATION/TRAINING PROGRAM

## 2023-12-29 PROCEDURE — 84132 ASSAY OF SERUM POTASSIUM: CPT

## 2023-12-29 PROCEDURE — 87804 INFLUENZA ASSAY W/OPTIC: CPT

## 2023-12-29 PROCEDURE — 1100000000 HC RM PRIVATE

## 2023-12-29 PROCEDURE — 86140 C-REACTIVE PROTEIN: CPT

## 2023-12-29 PROCEDURE — 2500000003 HC RX 250 WO HCPCS: Performed by: STUDENT IN AN ORGANIZED HEALTH CARE EDUCATION/TRAINING PROGRAM

## 2023-12-29 PROCEDURE — 71045 X-RAY EXAM CHEST 1 VIEW: CPT

## 2023-12-29 PROCEDURE — 6370000000 HC RX 637 (ALT 250 FOR IP): Performed by: INTERNAL MEDICINE

## 2023-12-29 PROCEDURE — 83880 ASSAY OF NATRIURETIC PEPTIDE: CPT

## 2023-12-29 PROCEDURE — 82330 ASSAY OF CALCIUM: CPT

## 2023-12-29 PROCEDURE — 82803 BLOOD GASES ANY COMBINATION: CPT

## 2023-12-29 PROCEDURE — 83605 ASSAY OF LACTIC ACID: CPT

## 2023-12-29 PROCEDURE — 84295 ASSAY OF SERUM SODIUM: CPT

## 2023-12-29 PROCEDURE — 96365 THER/PROPH/DIAG IV INF INIT: CPT

## 2023-12-29 PROCEDURE — 93005 ELECTROCARDIOGRAM TRACING: CPT | Performed by: STUDENT IN AN ORGANIZED HEALTH CARE EDUCATION/TRAINING PROGRAM

## 2023-12-29 PROCEDURE — 80053 COMPREHEN METABOLIC PANEL: CPT

## 2023-12-29 PROCEDURE — 99222 1ST HOSP IP/OBS MODERATE 55: CPT | Performed by: INTERNAL MEDICINE

## 2023-12-29 PROCEDURE — 81001 URINALYSIS AUTO W/SCOPE: CPT

## 2023-12-29 PROCEDURE — 87040 BLOOD CULTURE FOR BACTERIA: CPT

## 2023-12-29 PROCEDURE — 36415 COLL VENOUS BLD VENIPUNCTURE: CPT

## 2023-12-29 PROCEDURE — 84484 ASSAY OF TROPONIN QUANT: CPT

## 2023-12-29 PROCEDURE — 85025 COMPLETE CBC W/AUTO DIFF WBC: CPT

## 2023-12-29 PROCEDURE — 80048 BASIC METABOLIC PNL TOTAL CA: CPT

## 2023-12-29 PROCEDURE — 99285 EMERGENCY DEPT VISIT HI MDM: CPT

## 2023-12-29 RX ORDER — SODIUM CHLORIDE 0.9 % (FLUSH) 0.9 %
5-40 SYRINGE (ML) INJECTION PRN
Status: DISCONTINUED | OUTPATIENT
Start: 2023-12-29 | End: 2024-01-03 | Stop reason: HOSPADM

## 2023-12-29 RX ORDER — DEXAMETHASONE SODIUM PHOSPHATE 10 MG/ML
6 INJECTION, SOLUTION INTRAMUSCULAR; INTRAVENOUS EVERY 24 HOURS
Status: DISCONTINUED | OUTPATIENT
Start: 2023-12-29 | End: 2024-01-02

## 2023-12-29 RX ORDER — AZITHROMYCIN 500 MG/1
500 TABLET, FILM COATED ORAL DAILY
Status: DISCONTINUED | OUTPATIENT
Start: 2023-12-29 | End: 2024-01-01

## 2023-12-29 RX ORDER — ACETAMINOPHEN 650 MG/1
650 SUPPOSITORY RECTAL EVERY 6 HOURS PRN
Status: DISCONTINUED | OUTPATIENT
Start: 2023-12-29 | End: 2024-01-03 | Stop reason: HOSPADM

## 2023-12-29 RX ORDER — ACETAMINOPHEN 325 MG/1
650 TABLET ORAL
Status: COMPLETED | OUTPATIENT
Start: 2023-12-29 | End: 2023-12-29

## 2023-12-29 RX ORDER — BENZONATATE 100 MG/1
100 CAPSULE ORAL 3 TIMES DAILY PRN
Status: DISCONTINUED | OUTPATIENT
Start: 2023-12-29 | End: 2024-01-03 | Stop reason: HOSPADM

## 2023-12-29 RX ORDER — ONDANSETRON 4 MG/1
4 TABLET, ORALLY DISINTEGRATING ORAL EVERY 8 HOURS PRN
Status: DISCONTINUED | OUTPATIENT
Start: 2023-12-29 | End: 2024-01-03 | Stop reason: HOSPADM

## 2023-12-29 RX ORDER — CYCLOBENZAPRINE HCL 10 MG
10 TABLET ORAL 3 TIMES DAILY PRN
Status: DISCONTINUED | OUTPATIENT
Start: 2023-12-29 | End: 2024-01-03 | Stop reason: HOSPADM

## 2023-12-29 RX ORDER — GUAIFENESIN 600 MG/1
600 TABLET, EXTENDED RELEASE ORAL 2 TIMES DAILY
Status: DISCONTINUED | OUTPATIENT
Start: 2023-12-29 | End: 2024-01-03 | Stop reason: HOSPADM

## 2023-12-29 RX ORDER — POTASSIUM CHLORIDE 20 MEQ/1
40 TABLET, EXTENDED RELEASE ORAL PRN
Status: DISCONTINUED | OUTPATIENT
Start: 2023-12-29 | End: 2024-01-03 | Stop reason: HOSPADM

## 2023-12-29 RX ORDER — HYDRALAZINE HYDROCHLORIDE 20 MG/ML
10 INJECTION INTRAMUSCULAR; INTRAVENOUS EVERY 6 HOURS PRN
Status: DISCONTINUED | OUTPATIENT
Start: 2023-12-29 | End: 2024-01-03 | Stop reason: HOSPADM

## 2023-12-29 RX ORDER — ASPIRIN 81 MG/1
81 TABLET, CHEWABLE ORAL DAILY
Status: DISCONTINUED | OUTPATIENT
Start: 2023-12-29 | End: 2024-01-03 | Stop reason: HOSPADM

## 2023-12-29 RX ORDER — ENOXAPARIN SODIUM 100 MG/ML
40 INJECTION SUBCUTANEOUS DAILY
Status: DISCONTINUED | OUTPATIENT
Start: 2023-12-29 | End: 2024-01-03 | Stop reason: HOSPADM

## 2023-12-29 RX ORDER — SODIUM CHLORIDE 9 MG/ML
INJECTION, SOLUTION INTRAVENOUS CONTINUOUS
Status: DISCONTINUED | OUTPATIENT
Start: 2023-12-29 | End: 2023-12-30

## 2023-12-29 RX ORDER — SODIUM CHLORIDE 0.9 % (FLUSH) 0.9 %
5-40 SYRINGE (ML) INJECTION EVERY 12 HOURS SCHEDULED
Status: DISCONTINUED | OUTPATIENT
Start: 2023-12-29 | End: 2024-01-03 | Stop reason: HOSPADM

## 2023-12-29 RX ORDER — LISINOPRIL 10 MG/1
10 TABLET ORAL DAILY
Status: DISCONTINUED | OUTPATIENT
Start: 2023-12-29 | End: 2024-01-03 | Stop reason: HOSPADM

## 2023-12-29 RX ORDER — ACETAMINOPHEN 325 MG/1
650 TABLET ORAL EVERY 6 HOURS PRN
Status: DISCONTINUED | OUTPATIENT
Start: 2023-12-29 | End: 2024-01-03 | Stop reason: HOSPADM

## 2023-12-29 RX ORDER — POTASSIUM CHLORIDE 7.45 MG/ML
10 INJECTION INTRAVENOUS PRN
Status: DISCONTINUED | OUTPATIENT
Start: 2023-12-29 | End: 2024-01-03 | Stop reason: HOSPADM

## 2023-12-29 RX ORDER — OXYCODONE HYDROCHLORIDE 5 MG/1
15 TABLET ORAL EVERY 4 HOURS PRN
Status: DISCONTINUED | OUTPATIENT
Start: 2023-12-29 | End: 2024-01-03 | Stop reason: HOSPADM

## 2023-12-29 RX ORDER — ALBUTEROL SULFATE 90 UG/1
2 AEROSOL, METERED RESPIRATORY (INHALATION) EVERY 6 HOURS PRN
Status: DISCONTINUED | OUTPATIENT
Start: 2023-12-29 | End: 2024-01-03 | Stop reason: HOSPADM

## 2023-12-29 RX ORDER — DEXTROMETHORPHAN POLISTIREX 30 MG/5ML
30 SUSPENSION ORAL EVERY 12 HOURS SCHEDULED
Status: DISCONTINUED | OUTPATIENT
Start: 2023-12-29 | End: 2024-01-03 | Stop reason: HOSPADM

## 2023-12-29 RX ORDER — PANTOPRAZOLE SODIUM 40 MG/1
40 TABLET, DELAYED RELEASE ORAL DAILY
Status: DISCONTINUED | OUTPATIENT
Start: 2023-12-29 | End: 2024-01-03 | Stop reason: HOSPADM

## 2023-12-29 RX ORDER — 0.9 % SODIUM CHLORIDE 0.9 %
1000 INTRAVENOUS SOLUTION INTRAVENOUS ONCE
Status: COMPLETED | OUTPATIENT
Start: 2023-12-29 | End: 2023-12-29

## 2023-12-29 RX ORDER — POLYETHYLENE GLYCOL 3350 17 G/17G
17 POWDER, FOR SOLUTION ORAL DAILY PRN
Status: DISCONTINUED | OUTPATIENT
Start: 2023-12-29 | End: 2024-01-03 | Stop reason: HOSPADM

## 2023-12-29 RX ORDER — MAGNESIUM SULFATE IN WATER 40 MG/ML
2000 INJECTION, SOLUTION INTRAVENOUS PRN
Status: DISCONTINUED | OUTPATIENT
Start: 2023-12-29 | End: 2024-01-03 | Stop reason: HOSPADM

## 2023-12-29 RX ORDER — PRAVASTATIN SODIUM 20 MG
20 TABLET ORAL NIGHTLY
Status: DISCONTINUED | OUTPATIENT
Start: 2023-12-29 | End: 2024-01-03 | Stop reason: HOSPADM

## 2023-12-29 RX ORDER — SODIUM CHLORIDE 9 MG/ML
INJECTION, SOLUTION INTRAVENOUS PRN
Status: DISCONTINUED | OUTPATIENT
Start: 2023-12-29 | End: 2024-01-03 | Stop reason: HOSPADM

## 2023-12-29 RX ORDER — GABAPENTIN 300 MG/1
600 CAPSULE ORAL 3 TIMES DAILY
Status: DISCONTINUED | OUTPATIENT
Start: 2023-12-29 | End: 2024-01-03 | Stop reason: HOSPADM

## 2023-12-29 RX ORDER — ONDANSETRON 2 MG/ML
4 INJECTION INTRAMUSCULAR; INTRAVENOUS EVERY 6 HOURS PRN
Status: DISCONTINUED | OUTPATIENT
Start: 2023-12-29 | End: 2024-01-03 | Stop reason: HOSPADM

## 2023-12-29 RX ADMIN — SODIUM CHLORIDE, PRESERVATIVE FREE 10 ML: 5 INJECTION INTRAVENOUS at 21:31

## 2023-12-29 RX ADMIN — SODIUM CHLORIDE: 9 INJECTION, SOLUTION INTRAVENOUS at 18:50

## 2023-12-29 RX ADMIN — ACETAMINOPHEN 650 MG: 325 TABLET ORAL at 23:13

## 2023-12-29 RX ADMIN — PRAVASTATIN SODIUM 20 MG: 20 TABLET ORAL at 21:41

## 2023-12-29 RX ADMIN — HYDRALAZINE HYDROCHLORIDE 10 MG: 20 INJECTION INTRAMUSCULAR; INTRAVENOUS at 21:40

## 2023-12-29 RX ADMIN — Medication 30 MG: at 21:42

## 2023-12-29 RX ADMIN — CEFTRIAXONE SODIUM 1000 MG: 1 INJECTION, POWDER, FOR SOLUTION INTRAMUSCULAR; INTRAVENOUS at 16:52

## 2023-12-29 RX ADMIN — ACETAMINOPHEN 650 MG: 325 TABLET ORAL at 16:42

## 2023-12-29 RX ADMIN — GUAIFENESIN 600 MG: 600 TABLET, EXTENDED RELEASE ORAL at 21:41

## 2023-12-29 RX ADMIN — GABAPENTIN 600 MG: 300 CAPSULE ORAL at 21:41

## 2023-12-29 RX ADMIN — CYCLOBENZAPRINE 10 MG: 10 TABLET, FILM COATED ORAL at 21:41

## 2023-12-29 RX ADMIN — SODIUM CHLORIDE 1000 ML: 9 INJECTION, SOLUTION INTRAVENOUS at 16:42

## 2023-12-29 RX ADMIN — DOXYCYCLINE 100 MG: 100 INJECTION, POWDER, LYOPHILIZED, FOR SOLUTION INTRAVENOUS at 16:54

## 2023-12-29 ASSESSMENT — LIFESTYLE VARIABLES
HOW MANY STANDARD DRINKS CONTAINING ALCOHOL DO YOU HAVE ON A TYPICAL DAY: PATIENT DOES NOT DRINK
HOW OFTEN DO YOU HAVE A DRINK CONTAINING ALCOHOL: NEVER

## 2023-12-29 NOTE — H&P
Hospitalist Admission Note    NAME: Torey Cheema   :  1955   MRN:  [id number]     Date/Time:  2023 5:51 PM    Patient PCP: Holly Henderson MD    ______________________________________________________________________  Given the patient's current clinical presentation, I have a high level of concern for decompensation if discharged from the emergency department.  Complex decision making was performed, which includes reviewing the patient's available past medical records, laboratory results, and x-ray films.       My assessment of this patient's clinical condition and my plan of care is as follows.    Assessment / Plan:    Sepsis  - Febrile, tachycardic with elevated LA and procalcitonin  - LA 2.48, trend  - Procalcitonin 22.06, trend  - ID consult  - Started on PO Zithromax by infectious disease   - Blood cultures     COVID  - Start on IV dexamethasone 6 mg daily   - On room air  - ID consulted     Hypertension  - Not on anti-hypertensives at home  - IV hydralazine PRN      HLD  - Continue ASA and statin    JAISON  - Apparently not using CPAP since losing weight     GERD  - Protonix                   Medical Decision Making:   I personally reviewed labs: CBC, CMP, troponin, procalitonin, LA  I personally reviewed imaging: CXR  Toxic drug monitoring: Lovenox for signs of bleeding  Discussed case with: ED provider. After discussion I am in agreement that acuity of patient's medical condition necessitates hospital stay.      Code Status: FULL  DVT Prophylaxis: Lovenox  GI Prophylaxis: Protonix      Subjective:   CHIEF COMPLAINT: Cough, chest discomfort     HISTORY OF PRESENT ILLNESS:     Torey Cheema is a 68 y.o.  female with PMHx significant for HTN, HLD, JAISON, and GERD presenting to the ED with a primary complaint of progressively worsening cough, chills, fatigue, and shortness of breath over last few days. Patient states she began to experience chest discomfort this

## 2023-12-30 LAB
ANION GAP SERPL CALC-SCNC: 10 MMOL/L (ref 5–15)
BASOPHILS # BLD: 0 K/UL (ref 0–0.1)
BASOPHILS NFR BLD: 0 % (ref 0–1)
BUN SERPL-MCNC: 16 MG/DL (ref 6–20)
BUN/CREAT SERPL: 11 (ref 12–20)
CA-I BLD-MCNC: 8.6 MG/DL (ref 8.5–10.1)
CHLORIDE SERPL-SCNC: 111 MMOL/L (ref 97–108)
CO2 SERPL-SCNC: 20 MMOL/L (ref 21–32)
CREAT SERPL-MCNC: 1.49 MG/DL (ref 0.55–1.02)
DIFFERENTIAL METHOD BLD: ABNORMAL
EOSINOPHIL # BLD: 0 K/UL (ref 0–0.4)
EOSINOPHIL NFR BLD: 0 % (ref 0–7)
ERYTHROCYTE [DISTWIDTH] IN BLOOD BY AUTOMATED COUNT: 16.3 % (ref 11.5–14.5)
GLUCOSE SERPL-MCNC: 106 MG/DL (ref 65–100)
HCT VFR BLD AUTO: 34.9 % (ref 35–47)
HGB BLD-MCNC: 10.8 G/DL (ref 11.5–16)
IMM GRANULOCYTES # BLD AUTO: 0 K/UL (ref 0–0.04)
IMM GRANULOCYTES NFR BLD AUTO: 0 % (ref 0–0.5)
LACTATE BLD-SCNC: 1.91 MMOL/L (ref 0.4–2)
LACTATE BLD-SCNC: 2.44 MMOL/L (ref 0.4–2)
LACTATE SERPL-SCNC: 5.1 MMOL/L (ref 0.4–2)
LYMPHOCYTES # BLD: 0.6 K/UL (ref 0.8–3.5)
LYMPHOCYTES NFR BLD: 7 % (ref 12–49)
MCH RBC QN AUTO: 24.8 PG (ref 26–34)
MCHC RBC AUTO-ENTMCNC: 30.9 G/DL (ref 30–36.5)
MCV RBC AUTO: 80 FL (ref 80–99)
MONOCYTES # BLD: 0.3 K/UL (ref 0–1)
MONOCYTES NFR BLD: 4 % (ref 5–13)
NEUTS SEG # BLD: 7.8 K/UL (ref 1.8–8)
NEUTS SEG NFR BLD: 89 % (ref 32–75)
NRBC # BLD: 0 K/UL (ref 0–0.01)
NRBC BLD-RTO: 0 PER 100 WBC
PERFORMED BY:: ABNORMAL
PERFORMED BY:: NORMAL
PLATELET # BLD AUTO: 153 K/UL (ref 150–400)
PMV BLD AUTO: 9.5 FL (ref 8.9–12.9)
POTASSIUM SERPL-SCNC: 4.3 MMOL/L (ref 3.5–5.1)
PROCALCITONIN SERPL-MCNC: 97.22 NG/ML
RBC # BLD AUTO: 4.36 M/UL (ref 3.8–5.2)
SODIUM SERPL-SCNC: 141 MMOL/L (ref 136–145)
TROPONIN I SERPL HS-MCNC: 4 NG/L (ref 0–51)
WBC # BLD AUTO: 8.7 K/UL (ref 3.6–11)

## 2023-12-30 PROCEDURE — 85025 COMPLETE CBC W/AUTO DIFF WBC: CPT

## 2023-12-30 PROCEDURE — 6360000002 HC RX W HCPCS: Performed by: STUDENT IN AN ORGANIZED HEALTH CARE EDUCATION/TRAINING PROGRAM

## 2023-12-30 PROCEDURE — 1100000000 HC RM PRIVATE

## 2023-12-30 PROCEDURE — 6370000000 HC RX 637 (ALT 250 FOR IP): Performed by: STUDENT IN AN ORGANIZED HEALTH CARE EDUCATION/TRAINING PROGRAM

## 2023-12-30 PROCEDURE — 84484 ASSAY OF TROPONIN QUANT: CPT

## 2023-12-30 PROCEDURE — 84145 PROCALCITONIN (PCT): CPT

## 2023-12-30 PROCEDURE — 80048 BASIC METABOLIC PNL TOTAL CA: CPT

## 2023-12-30 PROCEDURE — 83605 ASSAY OF LACTIC ACID: CPT

## 2023-12-30 PROCEDURE — 2580000003 HC RX 258: Performed by: STUDENT IN AN ORGANIZED HEALTH CARE EDUCATION/TRAINING PROGRAM

## 2023-12-30 PROCEDURE — 36415 COLL VENOUS BLD VENIPUNCTURE: CPT

## 2023-12-30 PROCEDURE — 6370000000 HC RX 637 (ALT 250 FOR IP): Performed by: INTERNAL MEDICINE

## 2023-12-30 PROCEDURE — 99232 SBSQ HOSP IP/OBS MODERATE 35: CPT | Performed by: INTERNAL MEDICINE

## 2023-12-30 RX ORDER — SODIUM CHLORIDE 9 MG/ML
INJECTION, SOLUTION INTRAVENOUS CONTINUOUS
Status: DISCONTINUED | OUTPATIENT
Start: 2023-12-30 | End: 2024-01-03 | Stop reason: HOSPADM

## 2023-12-30 RX ADMIN — ASPIRIN 81 MG: 81 TABLET, CHEWABLE ORAL at 09:14

## 2023-12-30 RX ADMIN — GUAIFENESIN 600 MG: 600 TABLET, EXTENDED RELEASE ORAL at 20:55

## 2023-12-30 RX ADMIN — SODIUM CHLORIDE, PRESERVATIVE FREE 10 ML: 5 INJECTION INTRAVENOUS at 21:04

## 2023-12-30 RX ADMIN — GABAPENTIN 600 MG: 300 CAPSULE ORAL at 14:38

## 2023-12-30 RX ADMIN — LISINOPRIL 10 MG: 10 TABLET ORAL at 09:14

## 2023-12-30 RX ADMIN — GABAPENTIN 600 MG: 300 CAPSULE ORAL at 20:55

## 2023-12-30 RX ADMIN — GABAPENTIN 600 MG: 300 CAPSULE ORAL at 09:14

## 2023-12-30 RX ADMIN — DEXAMETHASONE SODIUM PHOSPHATE 6 MG: 10 INJECTION, SOLUTION INTRAMUSCULAR; INTRAVENOUS at 20:55

## 2023-12-30 RX ADMIN — Medication 30 MG: at 09:14

## 2023-12-30 RX ADMIN — GUAIFENESIN 600 MG: 600 TABLET, EXTENDED RELEASE ORAL at 09:14

## 2023-12-30 RX ADMIN — ACETAMINOPHEN 650 MG: 325 TABLET ORAL at 10:46

## 2023-12-30 RX ADMIN — ACETAMINOPHEN 650 MG: 325 TABLET ORAL at 05:05

## 2023-12-30 RX ADMIN — Medication 30 MG: at 21:17

## 2023-12-30 RX ADMIN — ENOXAPARIN SODIUM 40 MG: 100 INJECTION SUBCUTANEOUS at 09:14

## 2023-12-30 RX ADMIN — PANTOPRAZOLE SODIUM 40 MG: 40 TABLET, DELAYED RELEASE ORAL at 09:14

## 2023-12-30 RX ADMIN — SODIUM CHLORIDE, PRESERVATIVE FREE 10 ML: 5 INJECTION INTRAVENOUS at 09:14

## 2023-12-30 RX ADMIN — AZITHROMYCIN DIHYDRATE 500 MG: 500 TABLET ORAL at 09:14

## 2023-12-30 RX ADMIN — SODIUM CHLORIDE: 9 INJECTION, SOLUTION INTRAVENOUS at 21:05

## 2023-12-30 RX ADMIN — PRAVASTATIN SODIUM 20 MG: 20 TABLET ORAL at 21:18

## 2023-12-30 ASSESSMENT — PAIN DESCRIPTION - LOCATION
LOCATION: GENERALIZED
LOCATION: ABDOMEN
LOCATION: EAR

## 2023-12-30 ASSESSMENT — PAIN SCALES - GENERAL
PAINLEVEL_OUTOF10: 7
PAINLEVEL_OUTOF10: 6
PAINLEVEL_OUTOF10: 3

## 2023-12-30 NOTE — ED PROVIDER NOTES
EMERGENCY DEPARTMENT HISTORY AND PHYSICAL EXAM      Date: 12/29/2023  Patient Name: Torey Cheema  MRN: [id number]  YOB: 1955  Date of evaluation: 12/29/2023  Provider: César Amezcua MD     History of Present Illness     Chief Complaint   Patient presents with    Chest Pain       History Provided By: Patient    HPI: Torey Cheema, 68 y.o. female with past medical history as listed and reviewed below presenting to the ED for cough, congestion, shortness of breath.  Patient states over the last 3 days her chest has been very congested to the point where when she coughs it hurts.  She notes she is vaccinated.  She denies any other symptoms.    Medical History     Past Medical History:  Past Medical History:   Diagnosis Date    Arthritis     GERD (gastroesophageal reflux disease)     H/O degenerative disc disease     Hyperlipemia     Hypertension     Nausea & vomiting     Obesity (BMI 30-39.9) 09/10/2018    BMI= 36    Sleep apnea     CPAP- has not used since weight loss    Vertigo        Past Surgical History:  Past Surgical History:   Procedure Laterality Date    CARDIAC CATHETERIZATION  2014    Patient states no blockages found    CERVICAL FUSION  09/18/2018    C4-C7    COLONOSCOPY      HYSTERECTOMY (CERVIX STATUS UNKNOWN)  1999    ORTHOPEDIC SURGERY Left 2014    hip replacement    OR UNLISTED PROCEDURE ABDOMEN PERITONEUM & OMENTUM  1989    Abdominal wall surgery after childbirth       Family History:  Family History   Problem Relation Age of Onset    Hypertension Mother     Diabetes Mother     High Cholesterol Mother     Cancer Father         Bladder    Breast Cancer Niece     Osteoarthritis Sister     Diabetes Sister     Arrhythmia Sister         Tachycardia    Alcohol Abuse Brother         Cirrrohis    Colon Cancer Brother     High Cholesterol Father     Hypertension Father     No Known Problems Sister     Heart Failure Mother         pacemaker       Social History:  Social

## 2023-12-31 ENCOUNTER — APPOINTMENT (OUTPATIENT)
Facility: HOSPITAL | Age: 68
DRG: 871 | End: 2023-12-31
Payer: MEDICARE

## 2023-12-31 PROBLEM — N17.9 AKI (ACUTE KIDNEY INJURY) (HCC): Status: ACTIVE | Noted: 2023-12-31

## 2023-12-31 PROBLEM — R05.1 ACUTE COUGH: Status: ACTIVE | Noted: 2023-12-31

## 2023-12-31 PROBLEM — R53.1 WEAKNESS: Status: ACTIVE | Noted: 2023-12-31

## 2023-12-31 LAB
ANION GAP SERPL CALC-SCNC: 6 MMOL/L (ref 5–15)
ARTERIAL PATENCY WRIST A: YES
BASE DEFICIT BLDA-SCNC: 5.3 MMOL/L
BASOPHILS # BLD: 0 K/UL (ref 0–0.1)
BASOPHILS NFR BLD: 0 % (ref 0–1)
BDY SITE: ABNORMAL
BODY TEMPERATURE: 99.8
BUN SERPL-MCNC: 24 MG/DL (ref 6–20)
BUN/CREAT SERPL: 11 (ref 12–20)
CA-I BLD-MCNC: 9 MG/DL (ref 8.5–10.1)
CHLORIDE SERPL-SCNC: 113 MMOL/L (ref 97–108)
CO2 SERPL-SCNC: 19 MMOL/L (ref 21–32)
COHGB MFR BLD: 0.3 % (ref 1–2)
CREAT SERPL-MCNC: 2.25 MG/DL (ref 0.55–1.02)
CREAT UR-MCNC: 66 MG/DL
DIFFERENTIAL METHOD BLD: ABNORMAL
EOSINOPHIL # BLD: 0 K/UL (ref 0–0.4)
EOSINOPHIL NFR BLD: 0 % (ref 0–7)
ERYTHROCYTE [DISTWIDTH] IN BLOOD BY AUTOMATED COUNT: 16.9 % (ref 11.5–14.5)
FIO2 ON VENT: 28 %
GAS FLOW.O2 O2 DELIVERY SYS: 2 L/MIN
GLUCOSE SERPL-MCNC: 184 MG/DL (ref 65–100)
HCO3 BLDA-SCNC: 19 MMOL/L (ref 22–26)
HCT VFR BLD AUTO: 42.3 % (ref 35–47)
HGB BLD-MCNC: 13.1 G/DL (ref 11.5–16)
IMM GRANULOCYTES # BLD AUTO: 0 K/UL (ref 0–0.04)
IMM GRANULOCYTES NFR BLD AUTO: 1 % (ref 0–0.5)
LACTATE SERPL-SCNC: 2.3 MMOL/L (ref 0.4–2)
LACTATE SERPL-SCNC: 3.5 MMOL/L (ref 0.4–2)
LACTATE SERPL-SCNC: 4.9 MMOL/L (ref 0.4–2)
LYMPHOCYTES # BLD: 1.2 K/UL (ref 0.8–3.5)
LYMPHOCYTES NFR BLD: 14 % (ref 12–49)
MCH RBC QN AUTO: 24.5 PG (ref 26–34)
MCHC RBC AUTO-ENTMCNC: 31 G/DL (ref 30–36.5)
MCV RBC AUTO: 79.1 FL (ref 80–99)
METHGB MFR BLD: 0.4 % (ref 0–1.4)
MONOCYTES # BLD: 0.1 K/UL (ref 0–1)
MONOCYTES NFR BLD: 2 % (ref 5–13)
NEUTS SEG # BLD: 7.2 K/UL (ref 1.8–8)
NEUTS SEG NFR BLD: 83 % (ref 32–75)
NRBC # BLD: 0 K/UL (ref 0–0.01)
NRBC BLD-RTO: 0 PER 100 WBC
OXYHGB MFR BLD: 94.1 % (ref 95–99)
PCO2 BLDA: 34 MMHG (ref 35–45)
PERFORMED BY:: ABNORMAL
PH BLDA: 7.36 (ref 7.35–7.45)
PLATELET # BLD AUTO: 173 K/UL (ref 150–400)
PMV BLD AUTO: 10.5 FL (ref 8.9–12.9)
PO2 BLDA: 79 MMHG (ref 80–100)
POTASSIUM SERPL-SCNC: 4.7 MMOL/L (ref 3.5–5.1)
POTASSIUM SERPL-SCNC: ABNORMAL MMOL/L (ref 3.5–5.1)
PROT UR-MCNC: 38 MG/DL (ref 0–11.9)
PROT/CREAT UR-RTO: 0.6
RBC # BLD AUTO: 5.35 M/UL (ref 3.8–5.2)
SAO2 % BLD: 95 % (ref 95–99)
SAO2% DEVICE SAO2% SENSOR NAME: ABNORMAL
SODIUM SERPL-SCNC: 138 MMOL/L (ref 136–145)
SPECIMEN SITE: ABNORMAL
WBC # BLD AUTO: 8.6 K/UL (ref 3.6–11)

## 2023-12-31 PROCEDURE — 6370000000 HC RX 637 (ALT 250 FOR IP): Performed by: STUDENT IN AN ORGANIZED HEALTH CARE EDUCATION/TRAINING PROGRAM

## 2023-12-31 PROCEDURE — 6360000002 HC RX W HCPCS: Performed by: STUDENT IN AN ORGANIZED HEALTH CARE EDUCATION/TRAINING PROGRAM

## 2023-12-31 PROCEDURE — 2580000003 HC RX 258: Performed by: STUDENT IN AN ORGANIZED HEALTH CARE EDUCATION/TRAINING PROGRAM

## 2023-12-31 PROCEDURE — 84156 ASSAY OF PROTEIN URINE: CPT

## 2023-12-31 PROCEDURE — 36600 WITHDRAWAL OF ARTERIAL BLOOD: CPT

## 2023-12-31 PROCEDURE — 85025 COMPLETE CBC W/AUTO DIFF WBC: CPT

## 2023-12-31 PROCEDURE — 83605 ASSAY OF LACTIC ACID: CPT

## 2023-12-31 PROCEDURE — 82803 BLOOD GASES ANY COMBINATION: CPT

## 2023-12-31 PROCEDURE — 99232 SBSQ HOSP IP/OBS MODERATE 35: CPT | Performed by: INTERNAL MEDICINE

## 2023-12-31 PROCEDURE — 71045 X-RAY EXAM CHEST 1 VIEW: CPT

## 2023-12-31 PROCEDURE — 2580000003 HC RX 258: Performed by: INTERNAL MEDICINE

## 2023-12-31 PROCEDURE — 6370000000 HC RX 637 (ALT 250 FOR IP): Performed by: INTERNAL MEDICINE

## 2023-12-31 PROCEDURE — 84132 ASSAY OF SERUM POTASSIUM: CPT

## 2023-12-31 PROCEDURE — 82570 ASSAY OF URINE CREATININE: CPT

## 2023-12-31 PROCEDURE — 1100000000 HC RM PRIVATE

## 2023-12-31 PROCEDURE — 80048 BASIC METABOLIC PNL TOTAL CA: CPT

## 2023-12-31 RX ORDER — SODIUM BICARBONATE 650 MG/1
650 TABLET ORAL 4 TIMES DAILY
Status: DISCONTINUED | OUTPATIENT
Start: 2023-12-31 | End: 2024-01-03 | Stop reason: HOSPADM

## 2023-12-31 RX ORDER — 0.9 % SODIUM CHLORIDE 0.9 %
500 INTRAVENOUS SOLUTION INTRAVENOUS ONCE
Status: COMPLETED | OUTPATIENT
Start: 2023-12-31 | End: 2023-12-31

## 2023-12-31 RX ADMIN — SODIUM CHLORIDE, PRESERVATIVE FREE 10 ML: 5 INJECTION INTRAVENOUS at 09:52

## 2023-12-31 RX ADMIN — AZITHROMYCIN DIHYDRATE 500 MG: 500 TABLET ORAL at 09:51

## 2023-12-31 RX ADMIN — SODIUM BICARBONATE 650 MG: 650 TABLET ORAL at 18:10

## 2023-12-31 RX ADMIN — ENOXAPARIN SODIUM 40 MG: 100 INJECTION SUBCUTANEOUS at 13:58

## 2023-12-31 RX ADMIN — SODIUM BICARBONATE 650 MG: 650 TABLET ORAL at 21:23

## 2023-12-31 RX ADMIN — ASPIRIN 81 MG: 81 TABLET, CHEWABLE ORAL at 09:50

## 2023-12-31 RX ADMIN — ACETAMINOPHEN 650 MG: 325 TABLET ORAL at 06:41

## 2023-12-31 RX ADMIN — Medication 30 MG: at 21:23

## 2023-12-31 RX ADMIN — GUAIFENESIN 600 MG: 600 TABLET, EXTENDED RELEASE ORAL at 09:50

## 2023-12-31 RX ADMIN — GABAPENTIN 600 MG: 300 CAPSULE ORAL at 14:28

## 2023-12-31 RX ADMIN — LISINOPRIL 10 MG: 10 TABLET ORAL at 09:51

## 2023-12-31 RX ADMIN — GABAPENTIN 600 MG: 300 CAPSULE ORAL at 21:23

## 2023-12-31 RX ADMIN — PANTOPRAZOLE SODIUM 40 MG: 40 TABLET, DELAYED RELEASE ORAL at 09:51

## 2023-12-31 RX ADMIN — SODIUM CHLORIDE, PRESERVATIVE FREE 10 ML: 5 INJECTION INTRAVENOUS at 21:21

## 2023-12-31 RX ADMIN — SODIUM CHLORIDE 500 ML: 9 INJECTION, SOLUTION INTRAVENOUS at 06:33

## 2023-12-31 RX ADMIN — GABAPENTIN 600 MG: 300 CAPSULE ORAL at 09:50

## 2023-12-31 RX ADMIN — GUAIFENESIN 600 MG: 600 TABLET, EXTENDED RELEASE ORAL at 21:24

## 2023-12-31 RX ADMIN — DEXAMETHASONE SODIUM PHOSPHATE 6 MG: 10 INJECTION, SOLUTION INTRAMUSCULAR; INTRAVENOUS at 18:10

## 2023-12-31 RX ADMIN — PRAVASTATIN SODIUM 20 MG: 20 TABLET ORAL at 21:32

## 2023-12-31 ASSESSMENT — PAIN DESCRIPTION - LOCATION: LOCATION: EAR

## 2023-12-31 ASSESSMENT — PAIN SCALES - GENERAL: PAINLEVEL_OUTOF10: 5

## 2023-12-31 NOTE — CARE COORDINATION
12/31/23 1556   Service Assessment   Patient Orientation Alert and Oriented   Cognition Alert   History Provided By Patient   Primary Caregiver Self   Support Systems Spouse/Significant Other   Patient's Healthcare Decision Maker is: Legal Next of Kin   PCP Verified by CM Yes   Last Visit to PCP Within last 3 months   Prior Functional Level Independent in ADLs/IADLs   Current Functional Level Independent in ADLs/IADLs   Can patient return to prior living arrangement Yes   Ability to make needs known: Good   Family able to assist with home care needs: Yes   Would you like for me to discuss the discharge plan with any other family members/significant others, and if so, who? Yes  (, David)   Financial Resources Medicare   Social/Functional History   Lives With Spouse   Home Equipment Walker, rolling   ADL Assistance Independent   Discharge Planning   Type of Residence House   Current Services Prior To Admission Durable Medical Equipment   Current DME Prior to Arrival Walker   Patient expects to be discharged to: House     Patient lives at home with her .  Patient uses a walker.  Patient is independent in care.  Patient has had outpatient therapy in the past.  Patient's  will be her ride at discharge.  Patient uses [facility] on [address], [place].  Current Dispo: Home/self    Advance Care Planning     General Advance Care Planning (ACP) Conversation    Date of Conversation: 12/29/2023  Conducted with: Patient with Decision Making Capacity    Healthcare Decision Maker:  No healthcare decision makers have been documented.  Click here to complete HealthCare Decision Makers including selection of the Healthcare Decision Maker Relationship (ie \"Primary\")   Today we documented Decision Maker(s) consistent with Legal Next of Kin hierarchy.    Content/Action Overview:  Has NO ACP documents/care preferences - requested patient complete ACP documents  Reviewed DNR/DNI and patient elects Full

## 2023-12-31 NOTE — ED NOTES
Assumed care of patient.  A&Ox4; no acute distress; no respiratory distress.    Patient on hospital bed.

## 2023-12-31 NOTE — ED NOTES
ED TO INPATIENT SBAR HANDOFF    Patient Name: Torey Cheema   Preferred Name: Torey  : 1955  68 y.o.   Family/Caregiver Present: no   Code Status Order: Full Code  PO Status:   Telemetry Order:   C-SSRS: Risk of Suicide: No Risk  Sitter no   Restraints:     Sepsis Risk Score Sepsis Risk Score: 0.99    Situation  Chief Complaint   Patient presents with    Chest Pain     Brief Description of Patient's Condition: Covid + from home.  Her respiratory status is pretty good.  Her cxr looks ok and her sats have been in the high 90's on RA all day.  Unfortunately, she seems to be septic, with lactic acids going up and down.  The last one was 3.5 and that is trending down.  She gets up to the bedside commode without any problems.    Mental Status: oriented, alert, coherent, logical, thought processes intact, and able to concentrate and follow conversation  Arrived from:Home  Imaging:   XR CHEST PORTABLE   Final Result   No focal lung opacity or acute cardiopulmonary abnormality.                XR CHEST PORTABLE    (Results Pending)     Abnormal labs:   Abnormal Labs Reviewed   COVID-19, RAPID - Abnormal; Notable for the following components:       Result Value    SARS-CoV-2, Rapid DETECTED (*)     All other components within normal limits   CBC WITH AUTO DIFFERENTIAL - Abnormal; Notable for the following components:    MCH 24.8 (*)     RDW 16.0 (*)     Neutrophils % 79 (*)     Lymphocytes % 11 (*)     Lymphocytes Absolute 0.7 (*)     All other components within normal limits   COMPREHENSIVE METABOLIC PANEL - Abnormal; Notable for the following components:    Chloride 109 (*)     Glucose 158 (*)     Bun/Cre Ratio 9 (*)      (*)     ALT 87 (*)     Alk Phosphatase 124 (*)     All other components within normal limits   PROCALCITONIN - Abnormal; Notable for the following components:    Procalcitonin 22.06 (*)     All other components within normal limits   URINALYSIS WITH REFLEX TO CULTURE -

## 2024-01-01 ENCOUNTER — APPOINTMENT (OUTPATIENT)
Facility: HOSPITAL | Age: 69
DRG: 871 | End: 2024-01-01
Payer: MEDICARE

## 2024-01-01 PROBLEM — R74.8 ELEVATED LIVER ENZYMES: Status: ACTIVE | Noted: 2024-01-01

## 2024-01-01 LAB
ALBUMIN SERPL-MCNC: 3.1 G/DL (ref 3.5–5)
ALBUMIN/GLOB SERPL: 1 (ref 1.1–2.2)
ALP SERPL-CCNC: 135 U/L (ref 45–117)
ALT SERPL-CCNC: 2294 U/L (ref 12–78)
ANION GAP SERPL CALC-SCNC: 5 MMOL/L (ref 5–15)
APAP SERPL-MCNC: <2 UG/ML (ref 10–30)
AST SERPL W P-5'-P-CCNC: 1988 U/L (ref 15–37)
BASOPHILS # BLD: 0 K/UL (ref 0–0.1)
BASOPHILS NFR BLD: 0 % (ref 0–1)
BILIRUB SERPL-MCNC: 0.6 MG/DL (ref 0.2–1)
BUN SERPL-MCNC: 26 MG/DL (ref 6–20)
BUN/CREAT SERPL: 15 (ref 12–20)
CA-I BLD-MCNC: 8.8 MG/DL (ref 8.5–10.1)
CHLORIDE SERPL-SCNC: 119 MMOL/L (ref 97–108)
CK SERPL-CCNC: 298 U/L (ref 26–192)
CO2 SERPL-SCNC: 20 MMOL/L (ref 21–32)
CREAT SERPL-MCNC: 1.77 MG/DL (ref 0.55–1.02)
CRP SERPL-MCNC: 4.34 MG/DL (ref 0–0.6)
DATE LAST DOSE: ABNORMAL
DIFFERENTIAL METHOD BLD: ABNORMAL
DOSE AMOUNT: ABNORMAL UNITS
EOSINOPHIL # BLD: 0 K/UL (ref 0–0.4)
EOSINOPHIL NFR BLD: 0 % (ref 0–7)
ERYTHROCYTE [DISTWIDTH] IN BLOOD BY AUTOMATED COUNT: 16.2 % (ref 11.5–14.5)
GLOBULIN SER CALC-MCNC: 3.2 G/DL (ref 2–4)
GLUCOSE SERPL-MCNC: 128 MG/DL (ref 65–100)
HCT VFR BLD AUTO: 35.5 % (ref 35–47)
HGB BLD-MCNC: 11.3 G/DL (ref 11.5–16)
IMM GRANULOCYTES # BLD AUTO: 0.1 K/UL (ref 0–0.04)
IMM GRANULOCYTES NFR BLD AUTO: 1 % (ref 0–0.5)
LIPASE SERPL-CCNC: 28 U/L (ref 13–75)
LYMPHOCYTES # BLD: 1.5 K/UL (ref 0.8–3.5)
LYMPHOCYTES NFR BLD: 14 % (ref 12–49)
MCH RBC QN AUTO: 25 PG (ref 26–34)
MCHC RBC AUTO-ENTMCNC: 31.8 G/DL (ref 30–36.5)
MCV RBC AUTO: 78.5 FL (ref 80–99)
MONOCYTES # BLD: 0.4 K/UL (ref 0–1)
MONOCYTES NFR BLD: 4 % (ref 5–13)
NEUTS SEG # BLD: 9.2 K/UL (ref 1.8–8)
NEUTS SEG NFR BLD: 81 % (ref 32–75)
NRBC # BLD: 0 K/UL (ref 0–0.01)
NRBC BLD-RTO: 0 PER 100 WBC
PLATELET # BLD AUTO: 163 K/UL (ref 150–400)
PMV BLD AUTO: 10 FL (ref 8.9–12.9)
POTASSIUM SERPL-SCNC: 4.2 MMOL/L (ref 3.5–5.1)
PROCALCITONIN SERPL-MCNC: 50.1 NG/ML
PROT SERPL-MCNC: 6.3 G/DL (ref 6.4–8.2)
RBC # BLD AUTO: 4.52 M/UL (ref 3.8–5.2)
SODIUM SERPL-SCNC: 144 MMOL/L (ref 136–145)
WBC # BLD AUTO: 11.2 K/UL (ref 3.6–11)

## 2024-01-01 PROCEDURE — 83690 ASSAY OF LIPASE: CPT

## 2024-01-01 PROCEDURE — 99232 SBSQ HOSP IP/OBS MODERATE 35: CPT | Performed by: INTERNAL MEDICINE

## 2024-01-01 PROCEDURE — 6370000000 HC RX 637 (ALT 250 FOR IP): Performed by: STUDENT IN AN ORGANIZED HEALTH CARE EDUCATION/TRAINING PROGRAM

## 2024-01-01 PROCEDURE — 80053 COMPREHEN METABOLIC PANEL: CPT

## 2024-01-01 PROCEDURE — 36415 COLL VENOUS BLD VENIPUNCTURE: CPT

## 2024-01-01 PROCEDURE — 74176 CT ABD & PELVIS W/O CONTRAST: CPT

## 2024-01-01 PROCEDURE — 2580000003 HC RX 258: Performed by: STUDENT IN AN ORGANIZED HEALTH CARE EDUCATION/TRAINING PROGRAM

## 2024-01-01 PROCEDURE — 86140 C-REACTIVE PROTEIN: CPT

## 2024-01-01 PROCEDURE — 1100000000 HC RM PRIVATE

## 2024-01-01 PROCEDURE — 6370000000 HC RX 637 (ALT 250 FOR IP): Performed by: INTERNAL MEDICINE

## 2024-01-01 PROCEDURE — 80143 DRUG ASSAY ACETAMINOPHEN: CPT

## 2024-01-01 PROCEDURE — 6360000002 HC RX W HCPCS: Performed by: STUDENT IN AN ORGANIZED HEALTH CARE EDUCATION/TRAINING PROGRAM

## 2024-01-01 PROCEDURE — 85025 COMPLETE CBC W/AUTO DIFF WBC: CPT

## 2024-01-01 PROCEDURE — 82550 ASSAY OF CK (CPK): CPT

## 2024-01-01 PROCEDURE — 84145 PROCALCITONIN (PCT): CPT

## 2024-01-01 RX ORDER — CALCIUM CARBONATE 500 MG/1
500 TABLET, CHEWABLE ORAL 3 TIMES DAILY PRN
Status: DISCONTINUED | OUTPATIENT
Start: 2024-01-01 | End: 2024-01-03 | Stop reason: HOSPADM

## 2024-01-01 RX ADMIN — Medication 30 MG: at 20:40

## 2024-01-01 RX ADMIN — SODIUM BICARBONATE 650 MG: 650 TABLET ORAL at 20:40

## 2024-01-01 RX ADMIN — SODIUM BICARBONATE 650 MG: 650 TABLET ORAL at 17:42

## 2024-01-01 RX ADMIN — ACETYLCYSTEINE 4920 MG: 200 INJECTION, SOLUTION INTRAVENOUS at 13:07

## 2024-01-01 RX ADMIN — GABAPENTIN 600 MG: 300 CAPSULE ORAL at 20:40

## 2024-01-01 RX ADMIN — AZITHROMYCIN DIHYDRATE 500 MG: 500 TABLET ORAL at 08:51

## 2024-01-01 RX ADMIN — Medication 30 MG: at 08:51

## 2024-01-01 RX ADMIN — GABAPENTIN 600 MG: 300 CAPSULE ORAL at 08:51

## 2024-01-01 RX ADMIN — GUAIFENESIN 600 MG: 600 TABLET, EXTENDED RELEASE ORAL at 08:51

## 2024-01-01 RX ADMIN — ACETYLCYSTEINE 9840 MG: 200 INJECTION, SOLUTION INTRAVENOUS at 18:27

## 2024-01-01 RX ADMIN — SODIUM BICARBONATE 650 MG: 650 TABLET ORAL at 13:19

## 2024-01-01 RX ADMIN — SODIUM BICARBONATE 650 MG: 650 TABLET ORAL at 08:51

## 2024-01-01 RX ADMIN — LISINOPRIL 10 MG: 10 TABLET ORAL at 08:51

## 2024-01-01 RX ADMIN — SODIUM CHLORIDE: 9 INJECTION, SOLUTION INTRAVENOUS at 09:07

## 2024-01-01 RX ADMIN — ENOXAPARIN SODIUM 40 MG: 100 INJECTION SUBCUTANEOUS at 08:51

## 2024-01-01 RX ADMIN — PRAVASTATIN SODIUM 20 MG: 20 TABLET ORAL at 20:40

## 2024-01-01 RX ADMIN — ASPIRIN 81 MG: 81 TABLET, CHEWABLE ORAL at 08:51

## 2024-01-01 RX ADMIN — GUAIFENESIN 600 MG: 600 TABLET, EXTENDED RELEASE ORAL at 20:40

## 2024-01-01 RX ADMIN — CALCIUM CARBONATE 500 MG: 500 TABLET, CHEWABLE ORAL at 20:50

## 2024-01-01 RX ADMIN — GABAPENTIN 600 MG: 300 CAPSULE ORAL at 13:19

## 2024-01-01 RX ADMIN — DEXAMETHASONE SODIUM PHOSPHATE 6 MG: 10 INJECTION, SOLUTION INTRAMUSCULAR; INTRAVENOUS at 17:42

## 2024-01-01 RX ADMIN — PANTOPRAZOLE SODIUM 40 MG: 40 TABLET, DELAYED RELEASE ORAL at 08:51

## 2024-01-01 RX ADMIN — SODIUM CHLORIDE, PRESERVATIVE FREE 10 ML: 5 INJECTION INTRAVENOUS at 20:42

## 2024-01-01 RX ADMIN — SODIUM CHLORIDE: 9 INJECTION, SOLUTION INTRAVENOUS at 00:53

## 2024-01-01 RX ADMIN — ACETYLCYSTEINE 14760 MG: 200 INJECTION, SOLUTION INTRAVENOUS at 11:47

## 2024-01-01 ASSESSMENT — ENCOUNTER SYMPTOMS
EYES NEGATIVE: 1
NAUSEA: 0
SHORTNESS OF BREATH: 1
RHINORRHEA: 0
SORE THROAT: 0
BACK PAIN: 0
COUGH: 1
DIARRHEA: 0
VOMITING: 0
TROUBLE SWALLOWING: 0
WHEEZING: 0
ABDOMINAL PAIN: 1
ALLERGIC/IMMUNOLOGIC NEGATIVE: 1

## 2024-01-01 ASSESSMENT — PAIN SCALES - GENERAL
PAINLEVEL_OUTOF10: 0
PAINLEVEL_OUTOF10: 0

## 2024-01-02 ENCOUNTER — APPOINTMENT (OUTPATIENT)
Facility: HOSPITAL | Age: 69
DRG: 871 | End: 2024-01-02
Payer: MEDICARE

## 2024-01-02 LAB
ALBUMIN SERPL-MCNC: 2.8 G/DL (ref 3.5–5)
ALBUMIN SERPL-MCNC: 2.9 G/DL (ref 3.5–5)
ALBUMIN/GLOB SERPL: 0.8 (ref 1.1–2.2)
ALBUMIN/GLOB SERPL: 0.8 (ref 1.1–2.2)
ALP SERPL-CCNC: 139 U/L (ref 45–117)
ALP SERPL-CCNC: 139 U/L (ref 45–117)
ALT SERPL-CCNC: 1699 U/L (ref 12–78)
ALT SERPL-CCNC: 1703 U/L (ref 12–78)
ANION GAP SERPL CALC-SCNC: 8 MMOL/L (ref 5–15)
AST SERPL W P-5'-P-CCNC: 664 U/L (ref 15–37)
AST SERPL W P-5'-P-CCNC: 691 U/L (ref 15–37)
BASOPHILS # BLD: 0 K/UL (ref 0–0.1)
BASOPHILS NFR BLD: 0 % (ref 0–1)
BILIRUB DIRECT SERPL-MCNC: 0.2 MG/DL (ref 0–0.2)
BILIRUB SERPL-MCNC: 0.6 MG/DL (ref 0.2–1)
BILIRUB SERPL-MCNC: 0.6 MG/DL (ref 0.2–1)
BUN SERPL-MCNC: 21 MG/DL (ref 6–20)
BUN/CREAT SERPL: 14 (ref 12–20)
CA-I BLD-MCNC: 9 MG/DL (ref 8.5–10.1)
CHLORIDE SERPL-SCNC: 116 MMOL/L (ref 97–108)
CO2 SERPL-SCNC: 21 MMOL/L (ref 21–32)
CREAT SERPL-MCNC: 1.5 MG/DL (ref 0.55–1.02)
CREAT UR-MCNC: 68 MG/DL
DIFFERENTIAL METHOD BLD: ABNORMAL
EOSINOPHIL # BLD: 0 K/UL (ref 0–0.4)
EOSINOPHIL NFR BLD: 0 % (ref 0–7)
ERYTHROCYTE [DISTWIDTH] IN BLOOD BY AUTOMATED COUNT: 16.1 % (ref 11.5–14.5)
GLOBULIN SER CALC-MCNC: 3.5 G/DL (ref 2–4)
GLOBULIN SER CALC-MCNC: 3.6 G/DL (ref 2–4)
GLUCOSE SERPL-MCNC: 170 MG/DL (ref 65–100)
HCT VFR BLD AUTO: 36.9 % (ref 35–47)
HGB BLD-MCNC: 11.7 G/DL (ref 11.5–16)
IMM GRANULOCYTES # BLD AUTO: 0.3 K/UL (ref 0–0.04)
IMM GRANULOCYTES NFR BLD AUTO: 2 % (ref 0–0.5)
LYMPHOCYTES # BLD: 2 K/UL (ref 0.8–3.5)
LYMPHOCYTES NFR BLD: 16 % (ref 12–49)
MCH RBC QN AUTO: 24.5 PG (ref 26–34)
MCHC RBC AUTO-ENTMCNC: 31.7 G/DL (ref 30–36.5)
MCV RBC AUTO: 77.4 FL (ref 80–99)
MONOCYTES # BLD: 0.7 K/UL (ref 0–1)
MONOCYTES NFR BLD: 6 % (ref 5–13)
NEUTS SEG # BLD: 9.2 K/UL (ref 1.8–8)
NEUTS SEG NFR BLD: 75 % (ref 32–75)
NRBC # BLD: 0.03 K/UL (ref 0–0.01)
NRBC BLD-RTO: 0.2 PER 100 WBC
PLATELET # BLD AUTO: 187 K/UL (ref 150–400)
PMV BLD AUTO: 10.8 FL (ref 8.9–12.9)
POTASSIUM SERPL-SCNC: 4 MMOL/L (ref 3.5–5.1)
PROT SERPL-MCNC: 6.3 G/DL (ref 6.4–8.2)
PROT SERPL-MCNC: 6.5 G/DL (ref 6.4–8.2)
PROT UR-MCNC: 37 MG/DL (ref 0–11.9)
PROT/CREAT UR-RTO: 0.5
RBC # BLD AUTO: 4.77 M/UL (ref 3.8–5.2)
SODIUM SERPL-SCNC: 145 MMOL/L (ref 136–145)
SODIUM UR-SCNC: 60 MMOL/L
WBC # BLD AUTO: 12.4 K/UL (ref 3.6–11)

## 2024-01-02 PROCEDURE — 82570 ASSAY OF URINE CREATININE: CPT

## 2024-01-02 PROCEDURE — 6360000002 HC RX W HCPCS: Performed by: STUDENT IN AN ORGANIZED HEALTH CARE EDUCATION/TRAINING PROGRAM

## 2024-01-02 PROCEDURE — 80053 COMPREHEN METABOLIC PANEL: CPT

## 2024-01-02 PROCEDURE — 2580000003 HC RX 258: Performed by: STUDENT IN AN ORGANIZED HEALTH CARE EDUCATION/TRAINING PROGRAM

## 2024-01-02 PROCEDURE — 6370000000 HC RX 637 (ALT 250 FOR IP): Performed by: INTERNAL MEDICINE

## 2024-01-02 PROCEDURE — 85025 COMPLETE CBC W/AUTO DIFF WBC: CPT

## 2024-01-02 PROCEDURE — 80076 HEPATIC FUNCTION PANEL: CPT

## 2024-01-02 PROCEDURE — 76700 US EXAM ABDOM COMPLETE: CPT

## 2024-01-02 PROCEDURE — 1100000000 HC RM PRIVATE

## 2024-01-02 PROCEDURE — 99232 SBSQ HOSP IP/OBS MODERATE 35: CPT | Performed by: INTERNAL MEDICINE

## 2024-01-02 PROCEDURE — 84156 ASSAY OF PROTEIN URINE: CPT

## 2024-01-02 PROCEDURE — 6370000000 HC RX 637 (ALT 250 FOR IP): Performed by: STUDENT IN AN ORGANIZED HEALTH CARE EDUCATION/TRAINING PROGRAM

## 2024-01-02 PROCEDURE — 84300 ASSAY OF URINE SODIUM: CPT

## 2024-01-02 RX ORDER — PREDNISONE 20 MG/1
20 TABLET ORAL DAILY
Status: DISCONTINUED | OUTPATIENT
Start: 2024-01-02 | End: 2024-01-03 | Stop reason: HOSPADM

## 2024-01-02 RX ADMIN — GABAPENTIN 600 MG: 300 CAPSULE ORAL at 08:56

## 2024-01-02 RX ADMIN — SODIUM BICARBONATE 650 MG: 650 TABLET ORAL at 22:01

## 2024-01-02 RX ADMIN — PRAVASTATIN SODIUM 20 MG: 20 TABLET ORAL at 22:01

## 2024-01-02 RX ADMIN — SODIUM BICARBONATE 650 MG: 650 TABLET ORAL at 18:01

## 2024-01-02 RX ADMIN — SODIUM BICARBONATE 650 MG: 650 TABLET ORAL at 08:56

## 2024-01-02 RX ADMIN — GABAPENTIN 600 MG: 300 CAPSULE ORAL at 22:00

## 2024-01-02 RX ADMIN — SODIUM BICARBONATE 650 MG: 650 TABLET ORAL at 13:05

## 2024-01-02 RX ADMIN — CYCLOBENZAPRINE 10 MG: 10 TABLET, FILM COATED ORAL at 13:09

## 2024-01-02 RX ADMIN — SODIUM CHLORIDE, PRESERVATIVE FREE 10 ML: 5 INJECTION INTRAVENOUS at 22:00

## 2024-01-02 RX ADMIN — GABAPENTIN 600 MG: 300 CAPSULE ORAL at 13:05

## 2024-01-02 RX ADMIN — ENOXAPARIN SODIUM 40 MG: 100 INJECTION SUBCUTANEOUS at 08:56

## 2024-01-02 RX ADMIN — PREDNISONE 20 MG: 20 TABLET ORAL at 13:04

## 2024-01-02 RX ADMIN — LISINOPRIL 10 MG: 10 TABLET ORAL at 08:57

## 2024-01-02 RX ADMIN — GUAIFENESIN 600 MG: 600 TABLET, EXTENDED RELEASE ORAL at 22:01

## 2024-01-02 RX ADMIN — SODIUM CHLORIDE: 9 INJECTION, SOLUTION INTRAVENOUS at 18:02

## 2024-01-02 RX ADMIN — Medication 30 MG: at 23:02

## 2024-01-02 RX ADMIN — SODIUM CHLORIDE, PRESERVATIVE FREE 10 ML: 5 INJECTION INTRAVENOUS at 09:00

## 2024-01-02 RX ADMIN — Medication 30 MG: at 08:55

## 2024-01-02 RX ADMIN — ASPIRIN 81 MG: 81 TABLET, CHEWABLE ORAL at 08:56

## 2024-01-02 RX ADMIN — PANTOPRAZOLE SODIUM 40 MG: 40 TABLET, DELAYED RELEASE ORAL at 08:57

## 2024-01-02 RX ADMIN — GUAIFENESIN 600 MG: 600 TABLET, EXTENDED RELEASE ORAL at 08:57

## 2024-01-02 ASSESSMENT — ENCOUNTER SYMPTOMS
VOMITING: 0
BACK PAIN: 0
ABDOMINAL PAIN: 0
DIARRHEA: 0
ALLERGIC/IMMUNOLOGIC NEGATIVE: 1
RHINORRHEA: 0
WHEEZING: 0
SORE THROAT: 0
TROUBLE SWALLOWING: 0
SHORTNESS OF BREATH: 0
NAUSEA: 0
EYES NEGATIVE: 1
COUGH: 1

## 2024-01-02 ASSESSMENT — PAIN DESCRIPTION - LOCATION: LOCATION: BACK

## 2024-01-02 ASSESSMENT — PAIN SCALES - GENERAL
PAINLEVEL_OUTOF10: 0
PAINLEVEL_OUTOF10: 6
PAINLEVEL_OUTOF10: 0
PAINLEVEL_OUTOF10: 5

## 2024-01-02 ASSESSMENT — PAIN - FUNCTIONAL ASSESSMENT: PAIN_FUNCTIONAL_ASSESSMENT: PREVENTS OR INTERFERES SOME ACTIVE ACTIVITIES AND ADLS

## 2024-01-02 ASSESSMENT — PAIN DESCRIPTION - DESCRIPTORS: DESCRIPTORS: SPASM;THROBBING

## 2024-01-02 ASSESSMENT — PAIN DESCRIPTION - ORIENTATION: ORIENTATION: POSTERIOR;LOWER

## 2024-01-02 NOTE — CONSULTS
[facility]  CONSULTATION    Name:  TEJINDER LOPEZ  MR#:  [id number]  :  1955  ACCOUNT #:  [id number]  DATE OF SERVICE:  2024    HOSPITAL NEPHROLOGY CONSULT    REFERRING PHYSICIAN:  RAKESH Bae    REASON FOR CONSULTATION:  Acute kidney injury for evaluation and management.    HISTORY OF PRESENT ILLNESS:  The patient is a delightful 68-year-old black woman who was admitted on 2023 after she presented to the emergency room with complaints of shortness of breath, not feeling well, congestion, cough and fever for two days.  She was found to have COVID-19.  She was found to be at high risk of complications and was admitted.  The patient's serum creatinine has been rising and yesterday it was 2.25.  The patient denies any previous knowledge of having kidney disease.  She denies any dysuric symptoms as urgency, frequency, hematuria, foamy urine, urinary incontinence, etc.  Our service was consulted to evaluate and help with the management.  The patient denies any intake of large amount of nonsteroidal antiinflammatory medications prior to this admission.    PAST MEDICAL HISTORY:  Degenerative joint disease, GERD, hyperlipidemia, hypertension, obesity, sleep apnea, vertigo, now COVID.    PAST SURGICAL HISTORY:  Cardiac catheterization, surgical fusion between C4 and C7, hysterectomy.    FAMILY HISTORY:  Mother with hypertension, diabetes, and high cholesterol.  Father with bladder cancer.  No history of renal disease in the family.    SOCIAL HISTORY:  The patient is a lifetime nonsmoker.  She denies alcohol and illicit drug abuse.    ALLERGIES:  ALLERGIC TO CODEINE, PENICILLIN, AND TOMATOES.    MEDICATIONS AT HOME:  Consisted of:  1.  Aspirin.  2.  Flexeril.  3.  Gabapentin.  4.  Pantoprazole.  5.  Pravachol.  6.  Tizanidine.  7.  Oxycodone.  8.  Naloxone.    REVIEW OF SYSTEMS:  Complains of body aches, joint pains, shortness of breath.    PHYSICAL 
Patient is seen and examined in her room.  Full consult- dictated  
Fact sheet for Patients: https://www.fda.gov/media/027295/download   Methodology: Isothermal Nucleic Acid Amplification Results verified, phoned to and read back by  ANNE BALTAZAR RN @   1708 / [facility]         Rapid influenza A/B antigens [[id number]] Collected: 12/29/23 1624    Order Status: Completed Specimen: Nasal Washing Updated: 12/29/23 1711     Influenza A Ag Negative        Influenza B Ag Negative                  Imaging:  XR CHEST PORTABLE    Result Date: 12/29/2023  No focal lung opacity or acute cardiopulmonary abnormality.        Assessment / Plan:     COVID infection, no sig infiltrates on CXR, On RA        Clear lungs on exam, does not appear septic         Febrile, normal WBC on routine labs Procal 22.06, and CRP 6.45        Continue on Decadron, add Azithromycin po, tolerating oral intake         Start Baricitinib due to concerns of decompensation         Routine labs and CXR in AM/ D/c IV Doxycycline     2. Sore throat/Cough due to #1: No pharyngeal exudates         Symptomatic mgt     3. Generalized weakness due to above     4. HTN, uncontrolled, mgt per primary     Magalie Ornelas MD     12/29/2023

## 2024-01-03 VITALS
WEIGHT: 217 LBS | HEIGHT: 64 IN | BODY MASS INDEX: 37.05 KG/M2 | DIASTOLIC BLOOD PRESSURE: 90 MMHG | TEMPERATURE: 98.1 F | SYSTOLIC BLOOD PRESSURE: 144 MMHG | RESPIRATION RATE: 18 BRPM | HEART RATE: 70 BPM | OXYGEN SATURATION: 99 %

## 2024-01-03 LAB
ALBUMIN SERPL-MCNC: 3 G/DL (ref 3.5–5)
ALBUMIN/GLOB SERPL: 1 (ref 1.1–2.2)
ALP SERPL-CCNC: 134 U/L (ref 45–117)
ALT SERPL-CCNC: 1117 U/L (ref 12–78)
ANION GAP SERPL CALC-SCNC: 6 MMOL/L (ref 5–15)
AST SERPL W P-5'-P-CCNC: 245 U/L (ref 15–37)
BILIRUB SERPL-MCNC: 0.4 MG/DL (ref 0.2–1)
BUN SERPL-MCNC: 22 MG/DL (ref 6–20)
BUN/CREAT SERPL: 16 (ref 12–20)
CA-I BLD-MCNC: 8.4 MG/DL (ref 8.5–10.1)
CHLORIDE SERPL-SCNC: 117 MMOL/L (ref 97–108)
CO2 SERPL-SCNC: 23 MMOL/L (ref 21–32)
CREAT SERPL-MCNC: 1.35 MG/DL (ref 0.55–1.02)
GLOBULIN SER CALC-MCNC: 3 G/DL (ref 2–4)
GLUCOSE SERPL-MCNC: 138 MG/DL (ref 65–100)
POTASSIUM SERPL-SCNC: 3.5 MMOL/L (ref 3.5–5.1)
PROT SERPL-MCNC: 6 G/DL (ref 6.4–8.2)
SODIUM SERPL-SCNC: 146 MMOL/L (ref 136–145)

## 2024-01-03 PROCEDURE — 36415 COLL VENOUS BLD VENIPUNCTURE: CPT

## 2024-01-03 PROCEDURE — 6370000000 HC RX 637 (ALT 250 FOR IP): Performed by: INTERNAL MEDICINE

## 2024-01-03 PROCEDURE — 6360000002 HC RX W HCPCS: Performed by: STUDENT IN AN ORGANIZED HEALTH CARE EDUCATION/TRAINING PROGRAM

## 2024-01-03 PROCEDURE — 6370000000 HC RX 637 (ALT 250 FOR IP): Performed by: STUDENT IN AN ORGANIZED HEALTH CARE EDUCATION/TRAINING PROGRAM

## 2024-01-03 PROCEDURE — 2580000003 HC RX 258: Performed by: STUDENT IN AN ORGANIZED HEALTH CARE EDUCATION/TRAINING PROGRAM

## 2024-01-03 PROCEDURE — 80053 COMPREHEN METABOLIC PANEL: CPT

## 2024-01-03 RX ORDER — GUAIFENESIN 600 MG/1
600 TABLET, EXTENDED RELEASE ORAL 2 TIMES DAILY
Qty: 14 TABLET | Refills: 0 | Status: SHIPPED | OUTPATIENT
Start: 2024-01-03

## 2024-01-03 RX ORDER — BENZONATATE 100 MG/1
100 CAPSULE ORAL 3 TIMES DAILY PRN
Qty: 21 CAPSULE | Refills: 0 | Status: SHIPPED | OUTPATIENT
Start: 2024-01-03 | End: 2024-01-10

## 2024-01-03 RX ORDER — ALBUTEROL SULFATE 90 UG/1
2 AEROSOL, METERED RESPIRATORY (INHALATION) EVERY 6 HOURS PRN
Qty: 18 G | Refills: 3 | Status: SHIPPED | OUTPATIENT
Start: 2024-01-03

## 2024-01-03 RX ORDER — METHYLPREDNISOLONE 4 MG/1
TABLET ORAL
Qty: 1 KIT | Refills: 0 | Status: SHIPPED | OUTPATIENT
Start: 2024-01-03 | End: 2024-01-09

## 2024-01-03 RX ADMIN — SODIUM CHLORIDE: 9 INJECTION, SOLUTION INTRAVENOUS at 02:58

## 2024-01-03 RX ADMIN — PREDNISONE 20 MG: 20 TABLET ORAL at 10:06

## 2024-01-03 RX ADMIN — SODIUM BICARBONATE 650 MG: 650 TABLET ORAL at 12:33

## 2024-01-03 RX ADMIN — Medication 30 MG: at 12:33

## 2024-01-03 RX ADMIN — SODIUM BICARBONATE 650 MG: 650 TABLET ORAL at 10:06

## 2024-01-03 RX ADMIN — LISINOPRIL 10 MG: 10 TABLET ORAL at 10:05

## 2024-01-03 RX ADMIN — ASPIRIN 81 MG: 81 TABLET, CHEWABLE ORAL at 10:06

## 2024-01-03 RX ADMIN — ENOXAPARIN SODIUM 40 MG: 100 INJECTION SUBCUTANEOUS at 10:04

## 2024-01-03 RX ADMIN — PANTOPRAZOLE SODIUM 40 MG: 40 TABLET, DELAYED RELEASE ORAL at 10:05

## 2024-01-03 RX ADMIN — GUAIFENESIN 600 MG: 600 TABLET, EXTENDED RELEASE ORAL at 10:05

## 2024-01-03 RX ADMIN — GABAPENTIN 600 MG: 300 CAPSULE ORAL at 10:05

## 2024-01-03 NOTE — PROGRESS NOTES
Infectious Disease Progress Note           Subjective:   Pt seen and examined at bedside, feels much better, remains on RA, liver enzymes are elevated. Still reporting LUQ abdominal pain. Appetite is somewhat better, coughing less. Seen by nephrology  Objective:   Physical Exam:     BP (!) 149/92   Pulse 62   Temp 98.2 °F (36.8 °C) (Oral)   Resp 19   Ht 1.626 m (5' 4\")   Wt 98.4 kg (217 lb)   SpO2 96%   BMI 37.25 kg/m²    O2 Device: None (Room air)    Temp (24hrs), Av.3 °F (36.8 °C), Min:98.1 °F (36.7 °C), Max:98.6 °F (37 °C)    701 -  1900  In: 240 [P.O.:240]  Out: -    1901 -  0700  In: 240 [P.O.:240]  Out: 400 [Urine:400]    General: NAD, AAO x 4  HEENT: TON, Moist mucosa   Lungs: CTA b/l, decreased at the bases, no wheeze/rhonchi  Heart: S1S2+, RRR, no murmur  Abdo: Soft, NT, ND, +BS   : No indwelling Bell  Exts: No edema, + pulses b/l   Skin: No wounds, No rashes or lesions    Data Review:       Recent Days:    Recent Labs     23  0542 23  0450 24  0723   WBC 8.7 8.6 11.2*   HGB 10.8* 13.1 11.3*   HCT 34.9* 42.3 35.5    173 163       Recent Labs     23  0542 23  0450 24  0723   BUN 16 24* 26*   CREATININE 1.49* 2.25* 1.77*         Lab Results   Component Value Date/Time    CRP 4.34 2024 07:23 AM      Microbiology     Results       Procedure Component Value Units Date/Time    Blood Culture 1 [[id number]] Collected: 23 1624    Order Status: Completed Specimen: Blood Updated: 236     Special Requests No Special Requests        Culture No growth 2 days       Blood Culture 2 [[id number]] Collected: 23    Order Status: Completed Specimen: Blood Updated: 23 0036     Special Requests No Special Requests        Culture No growth 2 days       COVID-19, Rapid [[id number]]  (Abnormal) Collected: 23    Order Status: Completed Specimen: Nasopharyngeal Updated: 23 1710 
                    Infectious Disease Progress Note           Subjective:   Pt seen and examined at bedside. Reports epigastric pain, no acute events since last seen   Objective:   Physical Exam:     /81   Pulse 91   Temp 98.6 °F (37 °C) (Oral)   Resp 25   Ht 1.626 m (5' 4\")   Wt 98.4 kg (217 lb)   SpO2 99%   BMI 37.25 kg/m²    O2 Device: None (Room air)    Temp (24hrs), Av.9 °F (37.2 °C), Min:98.3 °F (36.8 °C), Max:99.8 °F (37.7 °C)    No intake/output data recorded.    190 -  0700  In: 1100   Out: -     General: NAD, AAO x 4  HEENT: TON, Moist mucosa   Lungs: CTA b/l, decreased at the bases, no wheeze/rhonchi  Heart: S1S2+, RRR, no murmur  Abdo: Soft, NT, ND, +BS   : No indwelling Bell  Exts: No edema, + pulses b/l   Skin: No wounds, No rashes or lesions    Data Review:       Recent Days:    Recent Labs     23  1624 23  0542 23  0450   WBC 6.8 8.7 8.6   HGB 12.9 10.8* 13.1   HCT 41.6 34.9* 42.3    153 173       Recent Labs     23  1624 23  1629 23  1902 23  0542 23  0450   BUN 8  --   --  16 24*   CREATININE 0.89   < > 0.58* 1.49* 2.25*    < > = values in this interval not displayed.         Lab Results   Component Value Date/Time    CRP 6.45 2023 04:24 PM      Microbiology     Results       Procedure Component Value Units Date/Time    Blood Culture 1 [[id number]] Collected: 23    Order Status: Completed Specimen: Blood Updated: 23     Special Requests No Special Requests        Culture No growth 2 days       Blood Culture 2 [[id number]] Collected: 23    Order Status: Completed Specimen: Blood Updated: 23     Special Requests No Special Requests        Culture No growth 2 days       COVID-19, Rapid [[id number]]  (Abnormal) Collected: 23 1624    Order Status: Completed Specimen: Nasopharyngeal Updated: 23 1710     SARS-CoV-2, Rapid DETECTED        Comment: Rapid 
                    Infectious Disease Progress Note           Subjective:   Pt seen examined at bedside, remains in the ED awaiting transfer to the floors.  Reports substernal chest pain.  Remains on RA, denies productive cough  Objective:   Physical Exam:     BP (!) 170/82   Pulse 73   Temp 98.7 °F (37.1 °C) (Oral)   Resp 21   Ht 1.626 m (5' 4\")   Wt 98.4 kg (217 lb)   SpO2 96%   BMI 37.25 kg/m²    O2 Device: None (Room air)    Temp (24hrs), Av.5 °F (37.5 °C), Min:98.7 °F (37.1 °C), Max:100.5 °F (38.1 °C)    No intake/output data recorded.   1901 -  0700  In: 1100   Out: -     General: NAD, AAO x 4  HEENT: TON, Moist mucosa   Lungs: CTA b/l, decreased at the bases, no wheeze/rhonchi  Heart: S1S2+, RRR, no murmur  Abdo: Soft, NT, ND, +BS   : No indwelling Bell  Exts: No edema, + pulses b/l   Skin: No wounds, No rashes or lesions    Data Review:       Recent Days:    Recent Labs     23  16223  0542   WBC 6.8 8.7   HGB 12.9 10.8*   HCT 41.6 34.9*    153     Recent Labs     23  1624 23  16223  1902 23  0542   BUN 8  --   --  16   CREATININE 0.89 0.72 0.58* 1.49*       Lab Results   Component Value Date/Time    CRP 6.45 2023 04:24 PM          Microbiology     Results       Procedure Component Value Units Date/Time    Blood Culture 1 [[id number]] Collected: 23    Order Status: Completed Specimen: Blood Updated: 23     Special Requests No Special Requests        Culture No growth after 16 hours       Blood Culture 2 [[id number]] Collected: 23    Order Status: Completed Specimen: Blood Updated: 23     Special Requests No Special Requests        Culture No growth after 16 hours       COVID-19, Rapid [[id number]]  (Abnormal) Collected: 23 1624    Order Status: Completed Specimen: Nasopharyngeal Updated: 23 1710     SARS-CoV-2, Rapid DETECTED        Comment: Rapid [facility] Now   The 
                    Infectious Disease Progress Note           Subjective:   Remains stable, denies new complaints, no acute events since last seen. Off supplemental O2, coughing less, afebrile, tolerating oral intake   Objective:   Physical Exam:     BP (!) 145/77   Pulse 55   Temp 98.5 °F (36.9 °C) (Oral)   Resp 18   Ht 1.626 m (5' 4\")   Wt 98.4 kg (217 lb)   SpO2 99%   BMI 37.25 kg/m²    O2 Device: None (Room air)    Temp (24hrs), Av.6 °F (37 °C), Min:98.5 °F (36.9 °C), Max:98.6 °F (37 °C)    701 -  1900  In: 400 [P.O.:400]  Out: 1000 [Urine:1000]   1901 -  0700  In: 480 [P.O.:480]  Out: 400 [Urine:400]    General: NAD, AAO x 4  HEENT: TON, Moist mucosa   Lungs: CTA b/l, decreased at the bases, no wheeze/rhonchi  Heart: S1S2+, RRR, no murmur  Abdo: Soft, NT, ND, +BS   : No indwelling Bell  Exts: No edema, + pulses b/l   Skin: No wounds, No rashes or lesions    Data Review:       Recent Days:    Recent Labs     23  0450 24  0723 24  0548   WBC 8.6 11.2* 12.4*   HGB 13.1 11.3* 11.7   HCT 42.3 35.5 36.9    163 187       Recent Labs     23  0450 24  0723 24  0548   BUN 24* 26* 21*   CREATININE 2.25* 1.77* 1.50*         Lab Results   Component Value Date/Time    CRP 4.34 2024 07:23 AM      Microbiology     Results       Procedure Component Value Units Date/Time    Blood Culture 1 [[id number]] Collected: 23    Order Status: Completed Specimen: Blood Updated: 23     Special Requests No Special Requests        Culture No growth 2 days       Blood Culture 2 [[id number]] Collected: 23    Order Status: Completed Specimen: Blood Updated: 23 0036     Special Requests No Special Requests        Culture No growth 2 days       COVID-19, Rapid [[id number]]  (Abnormal) Collected: 23    Order Status: Completed Specimen: Nasopharyngeal Updated: 23 1710     SARS-CoV-2, Rapid DETECTED        
        Renal Progress Note    NAME:  Torey Cheema   :   1955   MRN:   [id number]     Date/Time:  2024 6:31 PM      Assessment:     SO - sec to Vol Depletion + ATN  COVID 19  Met Acidosis - sec to SO       Plan:     GFR is improving.  Avoid NSAIDs + IV contrast  Dose meds for her GFR         Subjective:         F/U - SO --> Cecelia --> 24       No major complaints.        Review of Systems:  Y  N       Y  N  []         []          Fever/chills                                               []         []          Chest Pain  []         []          Cough                                                       []         []          Diarrhea   []         []          Sputum                                                     []         []          Constipation  []         []          SOB/RAMIREZ                                                []         []          Nausea/Vomit  []         []          Abd Pain                                                    []         []          Tolerating PT  []         []          Dysuria                                                      []         []          Tolerating Diet     []        Unable to obtain  ROS due to  []        mental status change  []        sedated   []        intubated    Medications reviewed:       Objective:   Vitals:  BP (!) 145/77   Pulse 55   Temp 98.5 °F (36.9 °C) (Oral)   Resp 18   Ht 1.626 m (5' 4\")   Wt 98.4 kg (217 lb)   SpO2 99%   BMI 37.25 kg/m²   Temp (24hrs), Av.6 °F (37 °C), Min:98.5 °F (36.9 °C), Max:98.6 °F (37 °C)           Last 24hr Input/Output:    Intake/Output Summary (Last 24 hours) at 2024 1831  Last data filed at 2024 1806  Gross per 24 hour   Intake 700 ml   Output 1500 ml   Net -800 ml        PHYSICAL EXAM:    Seen in Room 571.    General:    Alert, cooperative, no distress, appears stated age.       Head:   Normocephalic, without obvious abnormality, atraumatic.    Eyes:   No 
      Hospitalist Progress Note    NAME:   Torey Cheema   : 1955   MRN: [id number]     Date/Time: 2024 12:34 PM  Patient PCP: Holly Henderson MD    Estimated discharge date: 24 hours  Barriers: Retroperitoneal ultrasound and morning labs to ensure continued downward trend of liver enzymes    Hospital course:  Patient is a 68-year-old female with a past medical history of HTN, HLD, JAISON and GERD who was admitted on 2023 for sepsis likely secondary to COVID.  CXR revealed no focal lung opacity or acute cardiopulmonary abnormality.  COVID-positive.  Elevated lactic acid and Pro-Enzo.  Blood cultures obtained and patient initiated on IV Rocephin and Doxy.  ID consulted and recommended IV azithromycin, Decadron and barcitinib.  Did not meet criteria for barcitinib per Pharm.D.  Worsening SO and metabolic acidosis, will consult nephrology and initiate patient on p.o. sodium bicarb.  Review morning labs on 2024 and noted substantial increase in liver enzymes. Spoke with patient and she reports been taking two 650 mg Tylenol twice daily for the last week prior to being admitted.  Contacted poison control on 2024 at approximately 1030 and they advised to initiate IV Mucomyst.  Order set placed and spoke with pharmacy.  Will check acetaminophen and CK level.  CT of the abdomen pelvis without contrast revealed no acute intra-abdominal pathology.  Scattered colonic diverticula without evidence of active inflammation.  Abdominal ultrasound revealed increased hepatic parenchymal echotexture compatible with hepatic steatosis. Nephrology evaluated patient and ordered a ultrasound of the retroperitoneum.  Check urine for proteinuria.  If proteinuria is evidence, follow-up and may order serology.  Avoid nephrotoxic agents at this time.  Will keep patient 1 more night to ensure continued downward trend of liver enzymes and plan for discharge tomorrow.    Assessment / 
      Hospitalist Progress Note    NAME:   Torey Cheema   : 1955   MRN: [id number]     Date/Time: 2024 9:44 AM  Patient PCP: Holly Henderson MD    Estimated discharge date: 48 hours  Barriers: IV antibiotics, nephrology consult and CT of the abdomen pelvis with likely GI consult    Hospital course:  Patient is a 68-year-old female with a past medical history of HTN, HLD, JAISON and GERD who was admitted on 2023 for sepsis likely secondary to COVID.  CXR revealed no focal lung opacity or acute cardiopulmonary abnormality.  COVID-positive.  Elevated lactic acid and Pro-Enzo.  Blood cultures obtained and patient initiated on IV Rocephin and Doxy.  ID consulted and recommended IV azithromycin, Decadron and barcitinib.  Did not meet criteria for barcitinib per Pharm.D.  Worsening SO and metabolic acidosis, will consult nephrology and initiate patient on p.o. sodium bicarb.    Assessment / Plan:  COVID-positive  Sepsis  Lactic acidosis  -CXR revealed no focal lung opacity or acute cardiopulmonary abnormality  -Pro-Enzo elevated to 97.22>50.10  -CRP 4.34  -Lactic acid 5.1>4.9>3.5  -UA negative  -ID evaluated patient and recommended IV azithromycin, Decadron and barcitinib.  Did not meet criteria for barcitinib per Pharm.D.  -Repeat CXR  -Continue Mucinex and dextromethorohan  -Tessalon and albuterol as needed    Transaminitis  -ALT 2294, AST 1988, continue to trend  -Spoke with patient and she reports been taking two 650 mg Tylenol twice daily for the last week prior to being admitted.  Contacted poison control on 2024 at approximately 1030 and they advised to initiate IV Mucomyst.  Order set placed and spoke with pharmacy.  Will check acetaminophen and CK level.  -CT of the abdomen pelvis and right upper quadrant ultrasound pending  -GI consult once CT abdomen pelvis complete, not available until tomorrow    SO -improving  Metabolic acidosis  -Creatinine 1.49>2.25>1.77  -Bicarb 
4 Eyes Skin Assessment     NAME:  Torey Cheema  YOB: 1955  MEDICAL RECORD NUMBER:  [id number]    The patient is being assessed for  Admission    I agree that at least one RN has performed a thorough Head to Toe Skin Assessment on the patient. ALL assessment sites listed below have been assessed.      Areas assessed by both nurses:    Head, Face, Ears, Shoulders, Back, Chest, Arms, Elbows, Hands, Sacrum. Buttock, Coccyx, Ischium, Legs. Feet and Heels, and Under Medical Devices         Does the Patient have a Wound? No noted wound(s)       Anderson Prevention initiated by RN: No  Wound Care Orders initiated by RN: No    Pressure Injury (Stage 3,4, Unstageable, DTI, NWPT, and Complex wounds) if present, place Wound referral order by RN under : No    New Ostomies, if present place, Ostomy referral order under : No     Nurse 1 eSignature: Electronically signed by Katya Candelaria RN on 12/31/23 at 7:34 PM EST    **SHARE this note so that the co-signing nurse can place an eSignature**    Nurse 2 eSignature: Electronically signed by Lela Hi RN on 12/31/23 at 7:35 PM EST    
Discharge plan of care/case management plan validated with provider discharge order.    Discharge instructions discussed with the patient and . All concerns addressed at this time.    PIV removed. Patient wheeled downstairs to go home with family.   
Teagan from poison   control center called and checked on patient labs, vital signs and mental status and stated that there is no need for any further mucomyst iv and that they were closing the case   
Complaint / Reason for Physician Visit  Patient evaluated at bedside resting comfortably this morning.  She denies any new complaints at this time.  Discussed with RN events overnight.       Objective:     VITALS:   Last 24hrs VS reviewed since prior progress note. Most recent are:  Patient Vitals for the past 24 hrs:   BP Temp Temp src Pulse Resp SpO2 Height Weight   12/30/23 1529 (!) 170/82 -- -- 73 21 96 % -- --   12/30/23 1459 (!) 182/75 -- -- 73 20 97 % -- --   12/30/23 1359 (!) 149/88 -- -- 71 18 96 % -- --   12/30/23 1333 (!) 151/76 -- -- 71 20 98 % -- --   12/30/23 1303 -- -- -- 87 27 98 % -- --   12/30/23 1218 -- -- -- 78 29 99 % -- --   12/30/23 1145 (!) 162/78 -- -- 78 25 98 % -- --   12/30/23 1130 (!) 151/75 -- -- 75 19 99 % -- --   12/30/23 1115 (!) 168/115 -- -- 76 23 97 % -- --   12/30/23 1057 (!) 149/76 -- -- 84 22 98 % -- --   12/30/23 1030 (!) 142/68 98.7 °F (37.1 °C) Oral 79 19 100 % -- --   12/30/23 0959 (!) 142/68 -- -- 81 21 100 % -- --   12/30/23 0949 124/88 -- -- 80 18 99 % -- --   12/30/23 0924 (!) 132/96 -- -- 81 22 100 % -- --   12/30/23 0923 (!) 132/96 98.8 °F (37.1 °C) Oral 79 16 98 % -- --   12/30/23 0912 -- -- -- -- -- -- 1.626 m (5' 4\") 98.4 kg (217 lb)   12/30/23 0841 119/62 -- -- 84 20 97 % -- --   12/30/23 0711 123/64 -- -- 82 17 99 % -- --   12/30/23 0619 (!) 158/64 -- -- 82 24 97 % -- --   12/30/23 0559 137/75 -- -- 80 20 98 % -- --   12/30/23 0549 (!) 123/58 -- -- 82 24 -- -- --   12/30/23 0529 134/67 -- -- 80 21 99 % -- --   12/30/23 0519 135/67 -- -- 84 23 98 % -- --   12/30/23 0509 (!) 143/75 -- -- 82 23 99 % -- --   12/30/23 0451 (!) 148/88 -- -- 85 22 98 % -- --   12/30/23 0418 130/68 -- -- 90 28 97 % -- --   12/30/23 0358 123/63 -- -- 86 29 95 % -- --   12/30/23 0345 (!) 106/45 -- -- 88 24 95 % -- --   12/30/23 0330 (!) 121/59 -- -- 87 24 95 % -- --   12/30/23 0315 137/63 -- -- 89 28 95 % -- --   12/30/23 0300 123/61 -- -- 87 24 95 % -- --   12/30/23 0240 (!) 145/67 -- -- 
numbness and headaches.   Hematological: Negative.    Psychiatric/Behavioral: Negative.     All other systems reviewed and are negative.     PHYSICAL EXAM:  Physical Exam  Vitals reviewed.   Constitutional:       General: She is not in acute distress.     Appearance: She is obese. She is not ill-appearing.   HENT:      Head: Normocephalic and atraumatic.   Eyes:      Extraocular Movements: Extraocular movements intact.   Cardiovascular:      Rate and Rhythm: Normal rate and regular rhythm.      Heart sounds: Normal heart sounds.   Pulmonary:      Effort: Pulmonary effort is normal. No respiratory distress.      Breath sounds: Normal breath sounds. No wheezing.   Abdominal:      Palpations: Abdomen is soft.      Tenderness: There is no abdominal tenderness.   Musculoskeletal:         General: No tenderness. Normal range of motion.      Cervical back: Normal range of motion. No tenderness.      Right lower leg: No edema.      Left lower leg: No edema.   Neurological:      Mental Status: She is alert and oriented to person, place, and time.          Reviewed most current lab test results and cultures  YES  Reviewed most current radiology test results   YES  Review and summation of old records today    NO  Reviewed patient's current orders and MAR    YES  PMH/SH reviewed - no change compared to H&P  ________________________________________________________________________  Care Plan discussed with:    Comments   Patient x    Family      RN x    Care Manager     Consultant                        Multidiciplinary team rounds were held today with , nursing, pharmacist and clinical coordinator.  Patient's plan of care was discussed; medications were reviewed and discharge planning was addressed.     ________________________________________________________________________  Total NON critical care TIME:  35  Minutes    Total CRITICAL CARE TIME Spent:   Minutes non procedure based      Comments   >50% of visit spent

## 2024-01-03 NOTE — DISCHARGE INSTRUCTIONS
Discharge instructions:  Complete Medrol Dosepak for steroid taper  Follow-up with PCP in 10 to 14 days for resolution of symptoms and continue management of other comorbidities and medications  Follow-up with nephrologist in 2 weeks for continued management of CKD

## 2024-01-03 NOTE — DISCHARGE SUMMARY
known as: TESSALON  Take 1 capsule by mouth 3 times daily as needed for Cough     guaiFENesin 600 MG extended release tablet  Commonly known as: MUCINEX  Take 1 tablet by mouth 2 times daily     methylPREDNISolone 4 MG tablet  Commonly known as: MEDROL DOSEPACK  Take by mouth.            CONTINUE taking these medications      benazepril 10 MG tablet  Commonly known as: LOTENSIN     cyclobenzaprine 10 MG tablet  Commonly known as: FLEXERIL     gabapentin 300 MG capsule  Commonly known as: NEURONTIN     naloxone 4 MG/0.1ML Liqd nasal spray     oxyCODONE 15 MG immediate release tablet  Commonly known as: OXY-IR     pantoprazole 40 MG tablet  Commonly known as: PROTONIX     pravastatin 40 MG tablet  Commonly known as: PRAVACHOL            STOP taking these medications      tiZANidine 2 MG capsule  Commonly known as: ZANAFLEX     tiZANidine 4 MG tablet  Commonly known as: ZANAFLEX            ASK your doctor about these medications      aspirin 81 MG chewable tablet               Where to Get Your Medications        These medications were sent to [id number] - [place], [place] - [address] - P [phone] - F [phone] [address] [facility] [address]      Phone: [phone]   albuterol sulfate  (90 Base) MCG/ACT inhaler  benzonatate 100 MG capsule  guaiFENesin 600 MG extended release tablet  methylPREDNISolone 4 MG tablet             DISPOSITION:    Home with Family:    X   Home with HH/PT/OT/RN:    SNF/LTC:    IFRAH:    OTHER:            Code status:   Recommended diet: cardiac diet  Recommended activity: activity as tolerated  Wound care: None      Follow up with:   PCP : Holly Henderson MD Brown, Lesli Antoinette, MD  [address] [facility] [address] [facility]  [address]  [facility] [address] [phone]    Follow up  Follow-up in 10 to 14 days for resolution of symptoms and continue management of other comorbidities and medications          Total time in minutes spent coordinating

## 2024-01-03 NOTE — PLAN OF CARE
Problem: Discharge Planning  Goal: Discharge to home or other facility with appropriate resources  Recent Flowsheet Documentation  Taken 1/3/2024 0928 by Rita Celis RN  Discharge to home or other facility with appropriate resources: Identify barriers to discharge with patient and caregiver  1/3/2024 0012 by Tamica Amaya, RN  Outcome: Progressing  Flowsheets (Taken 1/2/2024 2030)  Discharge to home or other facility with appropriate resources: Identify barriers to discharge with patient and caregiver     Problem: Pain  Goal: Verbalizes/displays adequate comfort level or baseline comfort level  1/3/2024 0012 by Tamica Amaya, RN  Outcome: Progressing     Problem: Safety - Adult  Goal: Free from fall injury  1/3/2024 0012 by Tamica Amaya, RN  Outcome: Progressing

## 2024-01-03 NOTE — PLAN OF CARE
Problem: Discharge Planning  Goal: Discharge to home or other facility with appropriate resources  Outcome: Progressing  Flowsheets (Taken 1/2/2024 2030)  Discharge to home or other facility with appropriate resources: Identify barriers to discharge with patient and caregiver     Problem: Safety - Adult  Goal: Free from fall injury  Outcome: Progressing     Problem: Pain  Goal: Verbalizes/displays adequate comfort level or baseline comfort level  Outcome: Progressing

## 2024-01-03 NOTE — DISCHARGE INSTR - COC
Continuity of Care Form    Patient Name: Torey Cheema   :  1955  MRN:  [id number]    Admit date:  2023  Discharge date:  1/3/2024    Code Status Order: Full Code   Advance Directives:     Admitting Physician:  Wenceslao Griffiths MD  PCP: Holly Henderson MD    Discharging Nurse: Rita HARDWICK   Discharging Hospital Unit/Room#: 571/01  Discharging Unit Phone Number: [phone]    Emergency Contact:   Extended Emergency Contact Information  Primary Emergency Contact: David Cheema  Address: [address] [place] [address] [facility]  Home Phone: [phone]  Mobile Phone: [phone]  Relation: Spouse  Secondary Emergency Contact: DeniViv  Address: [place] [address] [facility]  Home Phone: [phone]  Mobile Phone: [phone]  Relation: Other    Past Surgical History:  Past Surgical History:   Procedure Laterality Date    CARDIAC CATHETERIZATION      Patient states no blockages found    CERVICAL FUSION  2018    C4-C7    COLONOSCOPY      HYSTERECTOMY (CERVIX STATUS UNKNOWN)      ORTHOPEDIC SURGERY Left 2014    hip replacement    [place] UNLISTED PROCEDURE ABDOMEN PERITONEUM & OMENTUM      Abdominal wall surgery after childbirth       Immunization History:   Immunization History   Administered Date(s) Administered    Influenza, FLUARIX, FLULAVAL, FLUZONE (age 6 mo+) AND AFLURIA, (age 3 y+), PF, 0.5mL 2018       Active Problems:  Patient Active Problem List   Diagnosis Code    Severe obesity (HCC) E66.01    Cervical stenosis of spinal canal M48.02    COVID-19 U07.1    Weakness R53.1    Acute cough R05.1    SO (acute kidney injury) ([facility]) N17.9    Elevated liver enzymes R74.8       Isolation/Infection:   Isolation            Droplet Plus          Patient Infection Status       Infection Onset Added Last Indicated Last Indicated By Review Planned Expiration Resolved Resolved By    COVID-19

## 2024-01-03 NOTE — CARE COORDINATION
CM has reviewed pt chart     DCP: home self care, lives with spouse    1125: CM met with pt and spouse at bedside to discuss potential dc. Pt agrees with plan, and days she has no needs for dc at this time. Spouse to transport. Verbal IMM given and explained. No questions.

## 2024-01-04 LAB
BACTERIA SPEC CULT: NORMAL
BACTERIA SPEC CULT: NORMAL
Lab: NORMAL
Lab: NORMAL

## 2024-02-02 ENCOUNTER — HOSPITAL ENCOUNTER (OUTPATIENT)
Facility: HOSPITAL | Age: 69
End: 2024-02-02
Attending: FAMILY MEDICINE
Payer: MEDICARE

## 2024-02-02 ENCOUNTER — TRANSCRIBE ORDERS (OUTPATIENT)
Facility: HOSPITAL | Age: 69
End: 2024-02-02

## 2024-02-02 DIAGNOSIS — R79.89 ELEVATED D-DIMER: Primary | ICD-10-CM

## 2024-02-02 DIAGNOSIS — Z86.16 HISTORY OF COVID-19: ICD-10-CM

## 2024-02-02 DIAGNOSIS — R79.89 ELEVATED D-DIMER: ICD-10-CM

## 2024-02-02 DIAGNOSIS — R06.09 DYSPNEA ON EXERTION: ICD-10-CM

## 2024-02-02 LAB — CREAT BLD-MCNC: 1.1 MG/DL (ref 0.6–1.3)

## 2024-02-02 PROCEDURE — 71275 CT ANGIOGRAPHY CHEST: CPT

## 2024-02-02 PROCEDURE — 6360000004 HC RX CONTRAST MEDICATION: Performed by: FAMILY MEDICINE

## 2024-02-02 PROCEDURE — 82565 ASSAY OF CREATININE: CPT

## 2024-02-02 RX ADMIN — IOPAMIDOL 100 ML: 755 INJECTION, SOLUTION INTRAVENOUS at 12:55

## 2024-08-30 ENCOUNTER — HOSPITAL ENCOUNTER (OUTPATIENT)
Facility: HOSPITAL | Age: 69
End: 2024-08-30
Attending: FAMILY MEDICINE
Payer: MEDICARE

## 2024-08-30 DIAGNOSIS — Z12.31 SCREENING MAMMOGRAM FOR HIGH-RISK PATIENT: ICD-10-CM

## 2024-08-30 PROCEDURE — 77063 BREAST TOMOSYNTHESIS BI: CPT

## 2025-08-20 ENCOUNTER — TRANSCRIBE ORDERS (OUTPATIENT)
Facility: HOSPITAL | Age: 70
End: 2025-08-20

## 2025-08-20 DIAGNOSIS — Z12.31 OTHER SCREENING MAMMOGRAM: Primary | ICD-10-CM

## 2025-09-03 ENCOUNTER — HOSPITAL ENCOUNTER (OUTPATIENT)
Facility: HOSPITAL | Age: 70
Discharge: HOME | End: 2025-09-06
Attending: FAMILY MEDICINE
Payer: MEDICARE

## 2025-09-03 DIAGNOSIS — Z12.31 OTHER SCREENING MAMMOGRAM: ICD-10-CM

## 2025-09-03 PROCEDURE — 77063 BREAST TOMOSYNTHESIS BI: CPT

## (undated) DEVICE — STERILE POLYISOPRENE POWDER-FREE SURGICAL GLOVES: Brand: PROTEXIS

## (undated) DEVICE — STOPCOCK IV 3W --

## (undated) DEVICE — SPONGE: SPECIALTY PEANUT XR 100/CS: Brand: MEDICAL ACTION INDUSTRIES

## (undated) DEVICE — SUTURE MCRYL SZ 4-0 L27IN ABSRB UD L19MM PS-2 1/2 CIR PRIM Y426H

## (undated) DEVICE — LIGHT HANDLE: Brand: DEVON

## (undated) DEVICE — NDL SPNE QNCKE 18GX3.5IN LF --

## (undated) DEVICE — MEDI-VAC NON-CONDUCTIVE SUCTION TUBING: Brand: CARDINAL HEALTH

## (undated) DEVICE — TIP CLEANER: Brand: VALLEYLAB

## (undated) DEVICE — ACCY PA100-A LEGEND LUB/DIFFUSER 4 PACK: Brand: MIDAS REX®

## (undated) DEVICE — DRAIN KT WND 10FR RND 400ML --

## (undated) DEVICE — DRAPE XR C ARM 41X74IN LF --

## (undated) DEVICE — FCPS RAD JAW 4LC 240CM W/NDL -- BX/20 RADIAL JAW 4

## (undated) DEVICE — SOLUTION IRRIG 1000ML H2O STRL BLT

## (undated) DEVICE — MAGNETIC DRAPE: Brand: DEVON

## (undated) DEVICE — DEVON™ KNEE AND BODY STRAP 60" X 3" (1.5 M X 7.6 CM): Brand: DEVON

## (undated) DEVICE — 1010 S-DRAPE TOWEL DRAPE 10/BX: Brand: STERI-DRAPE™

## (undated) DEVICE — 3M™ TEGADERM™ TRANSPARENT FILM DRESSING FRAME STYLE, 1626W, 4 IN X 4-3/4 IN (10 CM X 12 CM), 50/CT 4CT/CASE: Brand: 3M™ TEGADERM™

## (undated) DEVICE — COVER,MAYO STAND,STERILE: Brand: MEDLINE

## (undated) DEVICE — BIPOLAR FORCEPS CORD: Brand: VALLEYLAB

## (undated) DEVICE — LINE SAMP L13FT NSL ORAL O2 SHT TERM USE NONINTUBATED UNI

## (undated) DEVICE — SOLUTION IV 1000ML 0.9% SOD CHL

## (undated) DEVICE — BIT DRL L12MM DIA2.3MM CERV STP W/ EPOXY RNG DISP PYRENEES

## (undated) DEVICE — CODMAN® SURGICAL PATTIES 3/4" X 3/4" (1.91CM X 1.91CM): Brand: CODMAN®

## (undated) DEVICE — 3M™ IOBAN™ 2 ANTIMICROBIAL INCISE DRAPE 6650EZ: Brand: IOBAN™ 2

## (undated) DEVICE — DRAPE,UTILITY,TAPE,15X26,STERILE: Brand: MEDLINE

## (undated) DEVICE — BASIC PACK: Brand: CONVERTORS

## (undated) DEVICE — SURGICAL PROCEDURE PACK BASIN MAJ SET CUST NO CAUT

## (undated) DEVICE — TELFA NON-ADHERENT ABSORBENT DRESSING: Brand: TELFA

## (undated) DEVICE — DRAPE,CHEST,FENES,15X10,STERIL: Brand: MEDLINE

## (undated) DEVICE — CATHETER IV 14GA L1.25IN TEF STR HUB INTROCAN SFTY

## (undated) DEVICE — GOWN,SIRUS,NONRNF,SETINSLV,XL,20/CS: Brand: MEDLINE

## (undated) DEVICE — INFECTION CONTROL KIT SYS

## (undated) DEVICE — BONE WAX WHITE: Brand: BONE WAX WHITE

## (undated) DEVICE — (D)STRIP SKN CLSR 0.5X4IN WHT --

## (undated) DEVICE — COVER,TABLE,60X90,STERILE: Brand: MEDLINE

## (undated) DEVICE — THE ENDO CARRY-ON PROCEDURE KIT CONTAINS ALL OF THE SUPPLIES AND INFECTION PREVENTION PRODUCTS NEEDED FOR ENDOSCOPIC PROCEDURES: Brand: ENDO CARRY-ON PROCEDURE KIT

## (undated) DEVICE — STERILE POLYISOPRENE POWDER-FREE SURGICAL GLOVES WITH EMOLLIENT COATING: Brand: PROTEXIS

## (undated) DEVICE — INSULATED BLADE ELECTRODE: Brand: EDGE

## (undated) DEVICE — ROCKER SWITCH PENCIL BLADE ELECTRODE, HOLSTER: Brand: EDGE

## (undated) DEVICE — DRAPE,REIN 53X77,STERILE: Brand: MEDLINE

## (undated) DEVICE — REM POLYHESIVE ADULT PATIENT RETURN ELECTRODE: Brand: VALLEYLAB

## (undated) DEVICE — SYRINGE MED 20ML STD CLR PLAS LUERLOCK TIP N CTRL DISP

## (undated) DEVICE — SYR 5ML 1/5 GRAD LL NSAF LF --

## (undated) DEVICE — DRAPE MICSCP W46XL120IN FOR ZEISS MD FEATURING CLEARLENS

## (undated) DEVICE — KENDALL SCD EXPRESS SLEEVES, KNEE LENGTH, MEDIUM: Brand: KENDALL SCD

## (undated) DEVICE — 3000CC GUARDIAN II: Brand: GUARDIAN

## (undated) DEVICE — PREP CHLORAPREP 10.5 ML ORG --

## (undated) DEVICE — 3M™ TEGADERM™ TRANSPARENT FILM DRESSING FRAME STYLE, 1624W, 2-3/8 IN X 2-3/4 IN (6 CM X 7 CM), 100/CT 4CT/CASE: Brand: 3M™ TEGADERM™

## (undated) DEVICE — MASTISOL ADHESIVE LIQ 2/3ML

## (undated) DEVICE — 3M™ DURAPORE™ SURGICAL TAPE 1538-3, 3 INCH X 10 YARD (7,5CM X 9,1M), 4 ROLLS/BOX: Brand: 3M™ DURAPORE™

## (undated) DEVICE — SUTURE VCRL SZ 3-0 L27IN ABSRB UD L26MM SH 1/2 CIR J416H

## (undated) DEVICE — TOOL 14MH30 LEGEND 14CM 3MM: Brand: MIDAS REX ™